# Patient Record
Sex: MALE | Race: WHITE | NOT HISPANIC OR LATINO
[De-identification: names, ages, dates, MRNs, and addresses within clinical notes are randomized per-mention and may not be internally consistent; named-entity substitution may affect disease eponyms.]

---

## 2018-01-29 PROBLEM — M75.102 ROTATOR CUFF SYNDROME OF LEFT SHOULDER: Status: ACTIVE | Noted: 2018-01-29

## 2018-03-27 PROBLEM — M47.812 FACET ARTHROPATHY, CERVICAL: Status: ACTIVE | Noted: 2018-03-27

## 2018-03-27 PROBLEM — M48.02 NEUROFORAMINAL STENOSIS OF CERVICAL SPINE: Status: ACTIVE | Noted: 2018-03-27

## 2018-04-10 ENCOUNTER — HOSPITAL (OUTPATIENT)
Dept: OTHER | Age: 63
End: 2018-04-10
Attending: EMERGENCY MEDICINE

## 2018-04-10 LAB
ALBUMIN SERPL-MCNC: 3.6 GM/DL (ref 3.6–5.1)
ALP SERPL-CCNC: 75 UNIT/L (ref 45–117)
ALT SERPL-CCNC: 25 UNIT/L
ANALYZER ANC (IANC): ABNORMAL
ANION GAP SERPL CALC-SCNC: 12 MMOL/L (ref 10–20)
AST SERPL-CCNC: 18 UNIT/L
BASOPHILS # BLD: 0 THOUSAND/MCL (ref 0–0.3)
BASOPHILS NFR BLD: 0 %
BILIRUB CONJ SERPL-MCNC: 0.1 MG/DL (ref 0–0.2)
BILIRUB SERPL-MCNC: 0.5 MG/DL (ref 0.2–1)
BUN SERPL-MCNC: 25 MG/DL (ref 6–20)
BUN/CREAT SERPL: 34 (ref 7–25)
CALCIUM SERPL-MCNC: 9 MG/DL (ref 8.4–10.2)
CHLORIDE: 105 MMOL/L (ref 98–107)
CO2 SERPL-SCNC: 26 MMOL/L (ref 21–32)
CREAT SERPL-MCNC: 0.74 MG/DL (ref 0.67–1.17)
DIFFERENTIAL METHOD BLD: ABNORMAL
EOSINOPHIL # BLD: 0.1 THOUSAND/MCL (ref 0.1–0.5)
EOSINOPHIL NFR BLD: 1 %
ERYTHROCYTE [DISTWIDTH] IN BLOOD: 13.5 % (ref 11–15)
GLUCOSE SERPL-MCNC: 118 MG/DL (ref 65–99)
HEMATOCRIT: 41.8 % (ref 39–51)
HGB BLD-MCNC: 13.7 GM/DL (ref 13–17)
LIPASE SERPL-CCNC: 109 UNIT/L (ref 73–393)
LYMPHOCYTES # BLD: 2.2 THOUSAND/MCL (ref 1–4)
LYMPHOCYTES NFR BLD: 35 %
MCH RBC QN AUTO: 30.7 PG (ref 26–34)
MCHC RBC AUTO-ENTMCNC: 32.8 GM/DL (ref 32–36.5)
MCV RBC AUTO: 93.7 FL (ref 78–100)
MONOCYTES # BLD: 0.3 THOUSAND/MCL (ref 0.3–0.9)
MONOCYTES NFR BLD: 4 %
NEUTROPHILS # BLD: 3.7 THOUSAND/MCL (ref 1.8–7.7)
NEUTROPHILS NFR BLD: 60 %
NEUTS SEG NFR BLD: ABNORMAL %
PERCENT NRBC: ABNORMAL
PLATELET # BLD: 245 THOUSAND/MCL (ref 140–450)
POTASSIUM SERPL-SCNC: 3.9 MMOL/L (ref 3.4–5.1)
PROT SERPL-MCNC: 7.3 GM/DL (ref 6.4–8.2)
RBC # BLD: 4.46 MILLION/MCL (ref 4.5–5.9)
SODIUM SERPL-SCNC: 139 MMOL/L (ref 135–145)
WBC # BLD: 6.2 THOUSAND/MCL (ref 4.2–11)

## 2018-08-13 ENCOUNTER — APPOINTMENT (OUTPATIENT)
Dept: GENERAL RADIOLOGY | Age: 63
End: 2018-08-13
Attending: NURSE PRACTITIONER
Payer: COMMERCIAL

## 2018-08-13 ENCOUNTER — HOSPITAL ENCOUNTER (OUTPATIENT)
Age: 63
Discharge: HOME | End: 2018-08-13
Payer: COMMERCIAL

## 2018-08-13 VITALS
SYSTOLIC BLOOD PRESSURE: 129 MMHG | HEIGHT: 69 IN | OXYGEN SATURATION: 98 % | TEMPERATURE: 98 F | WEIGHT: 315 LBS | BODY MASS INDEX: 46.65 KG/M2 | DIASTOLIC BLOOD PRESSURE: 70 MMHG | RESPIRATION RATE: 19 BRPM | HEART RATE: 65 BPM
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
## 2018-08-13 DIAGNOSIS — S20.211A CONTUSION OF RIB ON RIGHT SIDE, INITIAL ENCOUNTER: ICD-10-CM

## 2018-08-13 PROCEDURE — 71101 X-RAY EXAM UNILAT RIBS/CHEST: CPT | Performed by: NURSE PRACTITIONER

## 2018-08-13 PROCEDURE — 99203 OFFICE O/P NEW LOW 30 MIN: CPT
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
## 2018-08-13 NOTE — ED PROVIDER NOTES
Patient presents with:  Fall      HPI:     Jonathan Franz is a 63year old male who presents for evaluation and management of a chief complaint of right sided rib pain after an injury that occurred 1 week ago.   The patient states he was climbing on some r distress  SKIN: good skin turgor, no obvious rashes. No bruising or swelling to the skin.   HEENT: atraumatic, normocephalic, ears, nose and throat are clear  EYES: sclera non icteric bilateral  NECK: supple, no adenopathy  LUNGS: clear to auscultation, no initial encounter [phone]            All results reviewed and discussed with patient. See AVS for detailed discharge instructions for your condition today.     Follow Up with:  Haleigh Barboza., MD [facility] [id number]  [address] [id number]

## 2019-02-27 ENCOUNTER — HOSPITAL (OUTPATIENT)
Dept: OTHER | Age: 64
End: 2019-02-27

## 2019-02-27 ENCOUNTER — HOSPITAL (OUTPATIENT)
Dept: OTHER | Age: 64
End: 2019-02-27
Attending: SURGERY

## 2019-03-01 LAB — PATHOLOGIST NAME: NORMAL

## 2019-08-29 PROCEDURE — 96372 THER/PROPH/DIAG INJ SC/IM: CPT

## 2019-08-29 PROCEDURE — 99284 EMERGENCY DEPT VISIT MOD MDM: CPT

## 2019-08-30 ENCOUNTER — APPOINTMENT (OUTPATIENT)
Dept: GENERAL RADIOLOGY | Facility: HOSPITAL | Age: 64
End: 2019-08-30
Attending: EMERGENCY MEDICINE
Payer: COMMERCIAL

## 2019-08-30 ENCOUNTER — HOSPITAL ENCOUNTER (EMERGENCY)
Facility: HOSPITAL | Age: 64
Discharge: HOME | End: 2019-08-30
Attending: EMERGENCY MEDICINE
Payer: COMMERCIAL

## 2019-08-30 VITALS
DIASTOLIC BLOOD PRESSURE: 77 MMHG | TEMPERATURE: 98 F | RESPIRATION RATE: 18 BRPM | WEIGHT: 315 LBS | OXYGEN SATURATION: 95 % | SYSTOLIC BLOOD PRESSURE: 132 MMHG | HEIGHT: 69 IN | BODY MASS INDEX: 46.65 KG/M2 | HEART RATE: 70 BPM

## 2019-08-30 DIAGNOSIS — M19.012 ARTHROSIS OF LEFT ACROMIOCLAVICULAR JOINT: Primary | ICD-10-CM

## 2019-08-30 DIAGNOSIS — M79.12 STERNOCLEIDOMASTOID MUSCLE TENDERNESS: ICD-10-CM

## 2019-08-30 PROCEDURE — 93010 ELECTROCARDIOGRAM REPORT: CPT | Performed by: EMERGENCY MEDICINE

## 2019-08-30 PROCEDURE — 73030 X-RAY EXAM OF SHOULDER: CPT | Performed by: EMERGENCY MEDICINE

## 2019-08-30 PROCEDURE — 93005 ELECTROCARDIOGRAM TRACING: CPT

## 2019-08-30 RX ORDER — LIDOCAINE 50 MG/G
1 PATCH TOPICAL EVERY 24 HOURS
Qty: 7 PATCH | Refills: 0 | Status: SHIPPED | OUTPATIENT
Start: 2019-08-30 | End: 2019-09-06

## 2019-08-30 RX ORDER — MELOXICAM 7.5 MG/1
7.5 TABLET ORAL DAILY
Qty: 14 TABLET | Refills: 0 | Status: SHIPPED | OUTPATIENT
Start: 2019-08-30 | End: 2019-08-30

## 2019-08-30 RX ORDER — ORPHENADRINE CITRATE 100 MG/1
100 TABLET, EXTENDED RELEASE ORAL 2 TIMES DAILY
Qty: 20 TABLET | Refills: 0 | Status: SHIPPED | OUTPATIENT
Start: 2019-08-30 | End: 2019-09-09

## 2019-08-30 RX ORDER — TRAMADOL HYDROCHLORIDE 50 MG/1
50 TABLET ORAL EVERY 12 HOURS PRN
Qty: 6 TABLET | Refills: 0 | Status: SHIPPED | OUTPATIENT
Start: 2019-08-30 | End: 2019-09-02

## 2019-08-30 RX ORDER — KETOROLAC TROMETHAMINE 30 MG/ML
30 INJECTION, SOLUTION INTRAMUSCULAR; INTRAVENOUS ONCE
Status: COMPLETED | OUTPATIENT
Start: 2019-08-30 | End: 2019-08-30

## 2019-08-30 RX ORDER — LIDOCAINE 50 MG/G
1 PATCH TOPICAL EVERY 24 HOURS
Qty: 7 PATCH | Refills: 0 | Status: SHIPPED | OUTPATIENT
Start: 2019-08-30 | End: 2019-08-30

## 2019-08-30 RX ORDER — ORPHENADRINE CITRATE 30 MG/ML
60 INJECTION INTRAMUSCULAR; INTRAVENOUS ONCE
Status: COMPLETED | OUTPATIENT
Start: 2019-08-30 | End: 2019-08-30

## 2019-08-30 RX ORDER — TRAMADOL HYDROCHLORIDE 50 MG/1
50 TABLET ORAL ONCE
Status: COMPLETED | OUTPATIENT
Start: 2019-08-30 | End: 2019-08-30
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
## 2019-08-30 NOTE — ED INITIAL ASSESSMENT (HPI)
AOX3 AMBULATORY TO ED PT CO OF LEFT SHOULDER PAIN X 3 DAYS RADIATING DOWN LEFT ARM. LIMITED MOVEMENT DENIES TRAUMA.  DISTAL PULSES PRESENT

## 2019-08-30 NOTE — ED PROVIDER NOTES
Patient Seen in: [facility] AND [facility] Emergency Department    History   Patient presents with:  Shoulder Pain    Stated Complaint: chest pain/shoulder pain radiating to left arm x 3 days      HPI    42-year-old male without past medical history presenting for systems are as noted in HPI. Constitutional and vital signs reviewed. All other systems reviewed and negative except as noted above. PSFH elements reviewed from today and agreed except as otherwise stated in HPI.     Physical Exam     ED Triage Vit AM    IMPRESSION:    Moderate arthrosis of the acromioclavicular joint. No fractures or effusion, small amount of incidental nitrogen gas within the glenohumeral joint. Alignment is unremarkable. Case faxed/finalized at 1:05 AM central time .   If Dillon Sargent [address]  [phone]    Call  For orthopedic followup and evaluation.       Medications Prescribed:  Discharge Medication List as of 8/30/2019  1:56 AM    START taking these medications    Orphenadrine Citrate  MG Oral Tablet 12 Hr  Ta

## 2019-10-09 ENCOUNTER — PROCEDURE VISIT (OUTPATIENT)
Dept: NEUROLOGY | Facility: CLINIC | Age: 64
End: 2019-10-09
Payer: COMMERCIAL

## 2019-10-09 VITALS — HEIGHT: 70 IN | BODY MASS INDEX: 45.1 KG/M2 | WEIGHT: 315 LBS

## 2019-10-09 DIAGNOSIS — M54.2 NECK PAIN ON LEFT SIDE: ICD-10-CM

## 2019-10-09 DIAGNOSIS — M79.602 LEFT ARM PAIN: ICD-10-CM

## 2019-10-09 PROCEDURE — 95886 MUSC TEST DONE W/N TEST COMP: CPT | Performed by: PHYSICAL MEDICINE & REHABILITATION

## 2019-10-09 PROCEDURE — 95909 NRV CNDJ TST 5-6 STUDIES: CPT | Performed by: PHYSICAL MEDICINE & REHABILITATION

## 2019-10-09 NOTE — PROCEDURES
[address] [place] [address]  Phone: [phone]  Fax: [phone] REPORT          Patient: Vijaya Manzanares Hand Dominance:  Right   Patient ID: [id number] Referring Dr:  Dr. Moiz Wilkins The needle EMG study was normal in all 8 tested muscles: L. Supraspinatus, L. Infraspinatus, L. C5 paraspinal, L. Biceps brachii, L. Deltoid, L. Triceps brachii, L. Pronator teres, L. First dorsal interosseous.           Conclusion:     1) This is technical A.Elbow ADM 8.33 10.4 5.52 A. Elbow - B. Elbow 10 1.93 52      Ref. Ref. ?50       Sensory NCS      Nerve / Sites Rec.  Site Onset Lat Peak Lat NP Amp PP Amp Segments Distance Velocity     ms ms µV µV  cm m/s   L Median - Digit II      Wrist Dig II 4

## 2020-01-27 PROBLEM — E66.01 MORBID OBESITY (HCC): Status: ACTIVE | Noted: 2018-06-08

## 2020-01-30 ENCOUNTER — HOSPITAL ENCOUNTER (EMERGENCY)
Facility: HOSPITAL | Age: 65
Discharge: HOME | End: 2020-01-30
Payer: COMMERCIAL

## 2020-01-30 ENCOUNTER — APPOINTMENT (OUTPATIENT)
Dept: GENERAL RADIOLOGY | Facility: HOSPITAL | Age: 65
End: 2020-01-30
Payer: COMMERCIAL

## 2020-01-30 VITALS
TEMPERATURE: 98 F | OXYGEN SATURATION: 95 % | SYSTOLIC BLOOD PRESSURE: 123 MMHG | HEIGHT: 70 IN | BODY MASS INDEX: 45.1 KG/M2 | WEIGHT: 315 LBS | RESPIRATION RATE: 20 BRPM | HEART RATE: 71 BPM | DIASTOLIC BLOOD PRESSURE: 80 MMHG

## 2020-01-30 DIAGNOSIS — M62.830 SPASM OF THORACIC BACK MUSCLE: Primary | ICD-10-CM

## 2020-01-30 PROCEDURE — 99284 EMERGENCY DEPT VISIT MOD MDM: CPT

## 2020-01-30 PROCEDURE — 93010 ELECTROCARDIOGRAM REPORT: CPT | Performed by: INTERNAL MEDICINE

## 2020-01-30 PROCEDURE — 72072 X-RAY EXAM THORAC SPINE 3VWS: CPT

## 2020-01-30 PROCEDURE — 93005 ELECTROCARDIOGRAM TRACING: CPT

## 2020-01-30 PROCEDURE — 96372 THER/PROPH/DIAG INJ SC/IM: CPT

## 2020-01-30 RX ORDER — IBUPROFEN 600 MG/1
600 TABLET ORAL EVERY 6 HOURS PRN
Qty: 30 TABLET | Refills: 0 | Status: SHIPPED | OUTPATIENT
Start: 2020-01-30 | End: 2020-02-06

## 2020-01-30 RX ORDER — ORPHENADRINE CITRATE 30 MG/ML
60 INJECTION INTRAMUSCULAR; INTRAVENOUS ONCE
Status: COMPLETED | OUTPATIENT
Start: 2020-01-30 | End: 2020-01-30

## 2020-01-30 RX ORDER — ORPHENADRINE CITRATE 100 MG/1
100 TABLET, EXTENDED RELEASE ORAL 2 TIMES DAILY PRN
Qty: 20 TABLET | Refills: 0 | Status: SHIPPED | OUTPATIENT
Start: 2020-01-30 | End: 2020-02-06

## 2020-01-30 RX ORDER — HYDROCODONE BITARTRATE AND ACETAMINOPHEN 5; 325 MG/1; MG/1
1-2 TABLET ORAL EVERY 6 HOURS PRN
Qty: 10 TABLET | Refills: 0 | Status: SHIPPED | OUTPATIENT
Start: 2020-01-30 | End: 2020-07-30

## 2020-01-30 RX ORDER — METHYLPREDNISOLONE 4 MG/1
TABLET ORAL
Qty: 1 PACKAGE | Refills: 0 | Status: SHIPPED | OUTPATIENT
Start: 2020-01-30 | End: 2020-07-27 | Stop reason: ALTCHOICE

## 2020-01-30 RX ORDER — KETOROLAC TROMETHAMINE 30 MG/ML
60 INJECTION, SOLUTION INTRAMUSCULAR; INTRAVENOUS ONCE
Status: COMPLETED | OUTPATIENT
Start: 2020-01-30 | End: 2020-01-30

## 2020-01-30 NOTE — ED PROVIDER NOTES
Patient Seen in: [facility] AND [facility] Emergency Department      History   Patient presents with:  Spasms    Stated Complaint: mid back spasm x3 days    HPI    60year old male with history of arthritis and prior history of similar back pain who presents wit Vitals signs and nursing note reviewed. Constitutional:       General: He is not in acute distress. Appearance: He is well-developed. He is not diaphoretic. HENT:      Head: Normocephalic and atraumatic.    Eyes:      Conjunctiva/sclera: Conjunctiva Radiology findings: Xr Thoracic Spine (3 Views) (cpt=72072)    Result Date: 1/30/2020  CONCLUSION:  1. Mild anterior wedging of T7, T8, T9, T11 and T12 which may be chronic. No marked compression fracture or subluxation.   Moderate multilevel spondylosis a HYDROcodone-acetaminophen (NORCO) 5-325 MG Oral Tab  Take 1-2 tablets by mouth every 6 (six) hours as needed for Pain (For severe pain. Do not take while driving or operating heavy machinery. )., Normal, Disp-10 tablet, R-0    Orphenadrine Citrate  MG

## 2020-01-30 NOTE — ED INITIAL ASSESSMENT (HPI)
Mid back spasms x3 days. Received steroids from IC yesterday. Spasms worsening thru night and into this morning.

## 2020-01-30 NOTE — ED NOTES
Thor Erps states relief in pain after receiving IM medications. deneis pain while at rest but states, \"I haven't tried to move yet. \"  Denies new complaints.

## 2020-02-01 PROBLEM — M75.102 ROTATOR CUFF SYNDROME OF LEFT SHOULDER: Status: RESOLVED | Noted: 2018-01-29 | Resolved: 2020-02-01

## 2020-02-21 ENCOUNTER — HOSPITAL ENCOUNTER (OUTPATIENT)
Facility: HOSPITAL | Age: 65
Setting detail: HOSPITAL OUTPATIENT SURGERY
Discharge: HOME | End: 2020-02-21
Attending: INTERNAL MEDICINE | Admitting: INTERNAL MEDICINE
Payer: COMMERCIAL

## 2020-02-21 DIAGNOSIS — Z12.11 SPECIAL SCREENING FOR MALIGNANT NEOPLASMS, COLON: ICD-10-CM

## 2020-02-21 PROCEDURE — 88305 TISSUE EXAM BY PATHOLOGIST: CPT | Performed by: INTERNAL MEDICINE

## 2020-02-21 PROCEDURE — 0DBM8ZX EXCISION OF DESCENDING COLON, VIA NATURAL OR ARTIFICIAL OPENING ENDOSCOPIC, DIAGNOSTIC: ICD-10-PCS | Performed by: INTERNAL MEDICINE

## 2020-02-21 PROCEDURE — 99153 MOD SED SAME PHYS/QHP EA: CPT | Performed by: INTERNAL MEDICINE

## 2020-02-21 PROCEDURE — 99152 MOD SED SAME PHYS/QHP 5/>YRS: CPT | Performed by: INTERNAL MEDICINE

## 2020-02-21 PROCEDURE — 0DBP8ZX EXCISION OF RECTUM, VIA NATURAL OR ARTIFICIAL OPENING ENDOSCOPIC, DIAGNOSTIC: ICD-10-PCS | Performed by: INTERNAL MEDICINE

## 2020-02-21 RX ORDER — SODIUM CHLORIDE 0.9 % (FLUSH) 0.9 %
10 SYRINGE (ML) INJECTION AS NEEDED
Status: DISCONTINUED | OUTPATIENT
Start: 2020-02-21 | End: 2020-02-21

## 2020-02-21 RX ORDER — MIDAZOLAM HYDROCHLORIDE 1 MG/ML
1 INJECTION INTRAMUSCULAR; INTRAVENOUS EVERY 5 MIN PRN
Status: DISCONTINUED | OUTPATIENT
Start: 2020-02-21 | End: 2020-02-21

## 2020-02-21 RX ORDER — SODIUM CHLORIDE, SODIUM LACTATE, POTASSIUM CHLORIDE, CALCIUM CHLORIDE 600; 310; 30; 20 MG/100ML; MG/100ML; MG/100ML; MG/100ML
INJECTION, SOLUTION INTRAVENOUS CONTINUOUS
Status: DISCONTINUED | OUTPATIENT
Start: 2020-02-21 | End: 2020-02-21

## 2020-02-21 RX ORDER — MIDAZOLAM HYDROCHLORIDE 1 MG/ML
INJECTION INTRAMUSCULAR; INTRAVENOUS
Status: DISCONTINUED | OUTPATIENT
Start: 2020-02-21 | End: 2020-02-21

## 2020-02-21 NOTE — OPERATIVE REPORT
ENDOSCOPY OPERATIVE REPORT    Patient Name: Richard Silva  Medical Record #: [id number]  YOB: 1955  Date of Procedure: 2/21/2020    Preoperative Diagnosis: colorectal cancer screening--patient presented as an average risk individual for co inadequate (repeat prep needed). FINDINGS: three small polyps were noted as above and removed using cold biopsy forceps.  The overall appearance of the mucosa was normal. At the anal verge the endoscope was retroverted, internal hemorrhoids were seen, no

## 2020-02-21 NOTE — H&P
History & Physical Examination    Patient Name: Ashwin Diane  MRN: [id number]  CSN: [id number]  YOB: 1955    Diagnosis: colorectal cancer screening--patient presented as an average risk individual for colorectal cancer.     Present Illness knee arthroscopy, vocal cord ploypectomis     Family History   Problem Relation Age of Onset   • Cancer Father         leukemia   • Other (Other) Son         rheumatoid arthritis     Social History    Tobacco Use      Smoking status: Never Smoker      Smok

## 2020-02-22 VITALS
HEIGHT: 70 IN | HEART RATE: 75 BPM | BODY MASS INDEX: 45.1 KG/M2 | SYSTOLIC BLOOD PRESSURE: 119 MMHG | WEIGHT: 315 LBS | RESPIRATION RATE: 14 BRPM | DIASTOLIC BLOOD PRESSURE: 87 MMHG | OXYGEN SATURATION: 98 %

## 2020-02-23 NOTE — PROGRESS NOTES
Here are the  biopsy/pathology findings from your recent Colonoscopy: Polyps removed were Not pre-cancerous type; Recommend repeat colonoscopy in 10 years. If you need any further assistance, please feel free to call [phone].     Thank you for puneet

## 2020-07-17 ENCOUNTER — HOSPITAL ENCOUNTER (EMERGENCY)
Facility: HOSPITAL | Age: 65
Discharge: HOME | End: 2020-07-17
Attending: EMERGENCY MEDICINE
Payer: COMMERCIAL

## 2020-07-17 VITALS
SYSTOLIC BLOOD PRESSURE: 155 MMHG | HEIGHT: 70 IN | RESPIRATION RATE: 16 BRPM | OXYGEN SATURATION: 98 % | TEMPERATURE: 98 F | HEART RATE: 64 BPM | WEIGHT: 315 LBS | DIASTOLIC BLOOD PRESSURE: 87 MMHG | BODY MASS INDEX: 45.1 KG/M2

## 2020-07-17 DIAGNOSIS — S46.811A TRAPEZIUS STRAIN, RIGHT, INITIAL ENCOUNTER: Primary | ICD-10-CM

## 2020-07-17 PROCEDURE — 96372 THER/PROPH/DIAG INJ SC/IM: CPT

## 2020-07-17 PROCEDURE — 99284 EMERGENCY DEPT VISIT MOD MDM: CPT

## 2020-07-17 RX ORDER — KETOROLAC TROMETHAMINE 30 MG/ML
60 INJECTION, SOLUTION INTRAMUSCULAR; INTRAVENOUS ONCE
Status: COMPLETED | OUTPATIENT
Start: 2020-07-17 | End: 2020-07-17

## 2020-07-17 RX ORDER — IBUPROFEN 800 MG/1
800 TABLET ORAL EVERY 8 HOURS PRN
Qty: 30 TABLET | Refills: 0 | Status: SHIPPED | OUTPATIENT
Start: 2020-07-17 | End: 2020-07-24

## 2020-07-17 RX ORDER — ORPHENADRINE CITRATE 100 MG/1
100 TABLET, EXTENDED RELEASE ORAL 2 TIMES DAILY PRN
Qty: 20 TABLET | Refills: 0 | Status: SHIPPED | OUTPATIENT
Start: 2020-07-17 | End: 2020-07-24

## 2020-07-17 RX ORDER — METHYLPREDNISOLONE 4 MG/1
TABLET ORAL
Qty: 1 PACKAGE | Refills: 0 | Status: SHIPPED | OUTPATIENT
Start: 2020-07-17 | End: 2020-07-27 | Stop reason: ALTCHOICE

## 2020-07-17 RX ORDER — HYDROCODONE BITARTRATE AND ACETAMINOPHEN 5; 325 MG/1; MG/1
1-2 TABLET ORAL EVERY 6 HOURS PRN
Qty: 16 TABLET | Refills: 0 | Status: SHIPPED | OUTPATIENT
Start: 2020-07-17 | End: 2021-08-18

## 2020-07-17 RX ORDER — ORPHENADRINE CITRATE 30 MG/ML
60 INJECTION INTRAMUSCULAR; INTRAVENOUS ONCE
Status: COMPLETED | OUTPATIENT
Start: 2020-07-17 | End: 2020-07-17

## 2020-07-17 NOTE — ED NOTES
Pt reports to room 35. Pt states he was driving and suddenly moved his neck when he felt a pop two days ago and now pain and decreased ROM since. Pain to right lateral side that radiates down shoulder and back. Stabbing and sharp in nature. 4/10.  No numbne

## 2020-07-17 NOTE — ED NOTES
Spoke with pharmacy to send lidocaine patch up for pt to go home with. Discharge instructions to follow.

## 2020-07-17 NOTE — ED INITIAL ASSESSMENT (HPI)
Right sided neck pain with pain radiating to right shoulder. Severe pain with movement. Chronic neck pain with worsening pain over the last 3 days. Denies paraesthesias.

## 2020-07-19 NOTE — ED PROVIDER NOTES
Patient Seen in: [facility] AND [facility] Emergency Department      History   Patient presents with:  Neck Pain    Stated Complaint: neck pain    HPI    60year old male with h/o arthritis who presents with R side neck pain radiating to R shoulder x 3 days but Physical Exam  Vitals signs and nursing note reviewed. Constitutional:       General: He is not in acute distress. Appearance: He is well-developed. He is not toxic-appearing or diaphoretic. HENT:      Head: Normocephalic and atraumatic.       Mouth Pt requesting pain control, will take referral for more local spine specialist. Pt states he has had this multiple times and does not feel that he needs any further work-up at this time. Pt understand reasons to return.      Disposition and Plan     [facility]

## 2020-07-27 ENCOUNTER — MED REC SCAN ONLY (OUTPATIENT)
Dept: NEUROLOGY | Facility: CLINIC | Age: 65
End: 2020-07-27

## 2020-07-27 ENCOUNTER — LAB ENCOUNTER (OUTPATIENT)
Dept: LAB | Age: 65
End: 2020-07-27
Attending: PHYSICAL MEDICINE & REHABILITATION
Payer: COMMERCIAL

## 2020-07-27 ENCOUNTER — OFFICE VISIT (OUTPATIENT)
Dept: NEUROLOGY | Facility: CLINIC | Age: 65
End: 2020-07-27
Payer: COMMERCIAL

## 2020-07-27 VITALS — WEIGHT: 315 LBS | BODY MASS INDEX: 45.1 KG/M2 | HEIGHT: 70 IN

## 2020-07-27 DIAGNOSIS — M25.50 POLYARTHRALGIA: ICD-10-CM

## 2020-07-27 DIAGNOSIS — E66.01 MORBID OBESITY (HCC): ICD-10-CM

## 2020-07-27 DIAGNOSIS — G47.00 INSOMNIA, UNSPECIFIED TYPE: ICD-10-CM

## 2020-07-27 DIAGNOSIS — M25.50 POLYARTHRALGIA: Primary | ICD-10-CM

## 2020-07-27 LAB
BASOPHILS # BLD AUTO: 0.03 X10(3) UL (ref 0–0.2)
BASOPHILS NFR BLD AUTO: 0.4 %
DEPRECATED RDW RBC AUTO: 46.8 FL (ref 35.1–46.3)
EOSINOPHIL # BLD AUTO: 0.08 X10(3) UL (ref 0–0.7)
EOSINOPHIL NFR BLD AUTO: 1.1 %
ERYTHROCYTE [DISTWIDTH] IN BLOOD BY AUTOMATED COUNT: 13.4 % (ref 11–15)
ERYTHROCYTE [SEDIMENTATION RATE] IN BLOOD: 15 MM/HR (ref 0–20)
HCT VFR BLD AUTO: 40.6 % (ref 39–53)
HGB BLD-MCNC: 13 G/DL (ref 13–17.5)
IMM GRANULOCYTES # BLD AUTO: 0.01 X10(3) UL (ref 0–1)
IMM GRANULOCYTES NFR BLD: 0.1 %
LYMPHOCYTES # BLD AUTO: 2.11 X10(3) UL (ref 1–4)
LYMPHOCYTES NFR BLD AUTO: 27.8 %
MCH RBC QN AUTO: 30.5 PG (ref 26–34)
MCHC RBC AUTO-ENTMCNC: 32 G/DL (ref 31–37)
MCV RBC AUTO: 95.3 FL (ref 80–100)
MONOCYTES # BLD AUTO: 0.45 X10(3) UL (ref 0.1–1)
MONOCYTES NFR BLD AUTO: 5.9 %
NEUTROPHILS # BLD AUTO: 4.92 X10 (3) UL (ref 1.5–7.7)
NEUTROPHILS # BLD AUTO: 4.92 X10(3) UL (ref 1.5–7.7)
NEUTROPHILS NFR BLD AUTO: 64.7 %
PLATELET # BLD AUTO: 240 10(3)UL (ref 150–450)
RBC # BLD AUTO: 4.26 X10(6)UL (ref 4.3–5.7)
RHEUMATOID FACT SERPL-ACNC: <10 IU/ML (ref ?–15)
TSI SER-ACNC: 1.21 MIU/ML (ref 0.36–3.74)
WBC # BLD AUTO: 7.6 X10(3) UL (ref 4–11)

## 2020-07-27 PROCEDURE — 86038 ANTINUCLEAR ANTIBODIES: CPT

## 2020-07-27 PROCEDURE — 85652 RBC SED RATE AUTOMATED: CPT

## 2020-07-27 PROCEDURE — 3008F BODY MASS INDEX DOCD: CPT | Performed by: PHYSICAL MEDICINE & REHABILITATION

## 2020-07-27 PROCEDURE — 84443 ASSAY THYROID STIM HORMONE: CPT

## 2020-07-27 PROCEDURE — 86431 RHEUMATOID FACTOR QUANT: CPT

## 2020-07-27 PROCEDURE — 86200 CCP ANTIBODY: CPT | Performed by: PHYSICAL MEDICINE & REHABILITATION

## 2020-07-27 PROCEDURE — 99244 OFF/OP CNSLTJ NEW/EST MOD 40: CPT | Performed by: PHYSICAL MEDICINE & REHABILITATION

## 2020-07-27 PROCEDURE — 85025 COMPLETE CBC W/AUTO DIFF WBC: CPT

## 2020-07-27 PROCEDURE — 36415 COLL VENOUS BLD VENIPUNCTURE: CPT

## 2020-07-27 RX ORDER — DULOXETIN HYDROCHLORIDE 30 MG/1
30 CAPSULE, DELAYED RELEASE ORAL DAILY
Qty: 30 CAPSULE | Refills: 0 | Status: SHIPPED | OUTPATIENT
Start: 2020-07-27 | End: 2020-08-31

## 2020-07-27 NOTE — PROGRESS NOTES
[address]  Progress Note    CHIEF COMPLAINT:  Patient presents with:  Neck Pain: New patient presents to follow up on ED visit on 07/17/2020 Denies any recent imaging.  Patient states that he does not recall Alcohol/week: 0.0 standard drinks      Drug use: Never        Comment: advil 3 - 4 per day      Sexual activity: Not on file      FAMILY HISTORY:   Family History   Problem Relation Age of Onset   • Cancer Father         leukemia   • Other (Other) Son Genitourinary  Difficulty Urinating: denies  Bladder Incontinence: denies  Pelvic Pain: admits  Painful Urination: denies   Musculoskeletal  Joint Stiffness: admits  Painful Joints: admits  Tailbone Pain: denies  Swollen Joints: denies   Peripheral Vascu unremarkable. ASSESSMENT AND PLAN:  1. Polyarthralgia  Although his major concern is neck pain, his real problem is widespread body ache.  He is not in the greatest physical condition but states he has gained 65# because he cant walk to work [employer]

## 2020-07-28 ENCOUNTER — TELEPHONE (OUTPATIENT)
Dept: NEUROLOGY | Facility: CLINIC | Age: 65
End: 2020-07-28

## 2020-07-28 LAB — CCP IGG SERPL-ACNC: 0.9 U/ML (ref 0–6.9)

## 2020-07-28 NOTE — TELEPHONE ENCOUNTER
S/w pt who states he will discuss anemia w/ his PCP - is taking ibuprofen 800mg and wants to be sure it will not interact w/ Cymbalta.  Informed pt the two medications are of different drug classes and although they will not interact, pt may want to discuss

## 2020-07-28 NOTE — TELEPHONE ENCOUNTER
----- Message from Chayo Leal MD sent at 7/28/2020 12:38 PM CDT -----  Please let the patient know that the majority of his blood tests have returned and look good. He might have some borderline anemia.   He might check with Dr. Dillon  about that, bu

## 2020-07-28 NOTE — TELEPHONE ENCOUNTER
Left detailed message (FYI verified) informing pt of lab results per Dr. Eve Castillo in previous note. Business hours/contact info left on  for further questions.

## 2020-07-30 LAB — NUCLEAR IGG TITR SER IF: NEGATIVE {TITER}

## 2020-07-31 ENCOUNTER — TELEPHONE (OUTPATIENT)
Dept: NEUROLOGY | Facility: CLINIC | Age: 65
End: 2020-07-31

## 2020-07-31 NOTE — TELEPHONE ENCOUNTER
----- Message from Mayank Pandey MD sent at 7/31/2020 10:25 AM CDT -----  All labs look good, pls let him know

## 2020-08-31 ENCOUNTER — TELEPHONE (OUTPATIENT)
Dept: NEUROLOGY | Facility: CLINIC | Age: 65
End: 2020-08-31

## 2020-08-31 DIAGNOSIS — M25.50 POLYARTHRALGIA: ICD-10-CM

## 2020-08-31 RX ORDER — DULOXETIN HYDROCHLORIDE 30 MG/1
30 CAPSULE, DELAYED RELEASE ORAL DAILY
Qty: 30 CAPSULE | Refills: 0 | Status: SHIPPED | OUTPATIENT
Start: 2020-08-31 | End: 2020-10-07

## 2020-08-31 NOTE — TELEPHONE ENCOUNTER
Medication request: Duloxetine 30mg 1 CAP QD    LOV: 07/27/20  NOV: none    [place] 07/27/20 #30 r-0

## 2020-10-07 DIAGNOSIS — M25.50 POLYARTHRALGIA: ICD-10-CM

## 2020-10-07 RX ORDER — DULOXETIN HYDROCHLORIDE 30 MG/1
CAPSULE, DELAYED RELEASE ORAL
Qty: 30 CAPSULE | Refills: 0 | Status: SHIPPED | OUTPATIENT
Start: 2020-10-07 | End: 2020-10-16

## 2020-10-07 NOTE — TELEPHONE ENCOUNTER
Medication request: Duloxetine 30mg 1 CAP DAILY    LOV: 07/27/20  NOV: 10/16/20    [place] 08/31/20 #30 r-0

## 2020-10-16 ENCOUNTER — OFFICE VISIT (OUTPATIENT)
Dept: NEUROLOGY | Facility: CLINIC | Age: 65
End: 2020-10-16
Payer: COMMERCIAL

## 2020-10-16 VITALS — BODY MASS INDEX: 45.1 KG/M2 | HEIGHT: 70 IN | WEIGHT: 315 LBS

## 2020-10-16 DIAGNOSIS — G47.00 INSOMNIA, UNSPECIFIED TYPE: ICD-10-CM

## 2020-10-16 DIAGNOSIS — E66.01 MORBID OBESITY (HCC): ICD-10-CM

## 2020-10-16 DIAGNOSIS — M25.50 POLYARTHRALGIA: Primary | ICD-10-CM

## 2020-10-16 PROCEDURE — 99213 OFFICE O/P EST LOW 20 MIN: CPT | Performed by: PHYSICAL MEDICINE & REHABILITATION

## 2020-10-16 PROCEDURE — 3008F BODY MASS INDEX DOCD: CPT | Performed by: PHYSICAL MEDICINE & REHABILITATION

## 2020-10-16 RX ORDER — DULOXETIN HYDROCHLORIDE 60 MG/1
60 CAPSULE, DELAYED RELEASE ORAL DAILY
Qty: 30 CAPSULE | Refills: 0 | Status: SHIPPED | OUTPATIENT
Start: 2020-10-16 | End: 2020-12-10

## 2020-10-27 ENCOUNTER — MED REC SCAN ONLY (OUTPATIENT)
Dept: NEUROLOGY | Facility: CLINIC | Age: 65
End: 2020-10-27

## 2020-12-10 DIAGNOSIS — M25.50 POLYARTHRALGIA: ICD-10-CM

## 2020-12-10 RX ORDER — DULOXETIN HYDROCHLORIDE 60 MG/1
CAPSULE, DELAYED RELEASE ORAL
Qty: 30 CAPSULE | Refills: 2 | Status: SHIPPED | OUTPATIENT
Start: 2020-12-10 | End: 2021-05-08

## 2020-12-10 NOTE — TELEPHONE ENCOUNTER
Medication request: Duloxetine 60mg 1 CAP QD    LOV: 10/16/20  NOV: none    [place] 10/16/20 #30 r-0    Patient states he has definitely noticed a difference in his pain when increasing to 60mg.  When he was taking 30mg daily his pain would start

## 2021-04-09 DIAGNOSIS — Z23 NEED FOR VACCINATION: ICD-10-CM

## 2021-05-08 ENCOUNTER — TELEPHONE (OUTPATIENT)
Dept: NEUROLOGY | Facility: CLINIC | Age: 66
End: 2021-05-08

## 2021-05-08 DIAGNOSIS — M25.50 POLYARTHRALGIA: ICD-10-CM

## 2021-05-08 RX ORDER — DULOXETIN HYDROCHLORIDE 60 MG/1
60 CAPSULE, DELAYED RELEASE ORAL DAILY
Qty: 30 CAPSULE | Refills: 2 | Status: SHIPPED | OUTPATIENT
Start: 2021-05-08 | End: 2021-06-02

## 2021-05-08 NOTE — TELEPHONE ENCOUNTER
Patient called saying he needs refill for Cymbalta 60mg daily. He finished his last tablet yesterday. He thought he had another bottle but does not. He denies any changes in pain, bowel/bladder, any new weakness.  He feels like the medication is very helpfu

## 2021-06-02 ENCOUNTER — OFFICE VISIT (OUTPATIENT)
Dept: NEUROLOGY | Facility: CLINIC | Age: 66
End: 2021-06-02
Payer: COMMERCIAL

## 2021-06-02 VITALS
DIASTOLIC BLOOD PRESSURE: 88 MMHG | SYSTOLIC BLOOD PRESSURE: 154 MMHG | BODY MASS INDEX: 45.1 KG/M2 | OXYGEN SATURATION: 95 % | WEIGHT: 315 LBS | HEIGHT: 70 IN | HEART RATE: 83 BPM

## 2021-06-02 DIAGNOSIS — M25.50 POLYARTHRALGIA: ICD-10-CM

## 2021-06-02 DIAGNOSIS — M25.511 CHRONIC PAIN OF BOTH SHOULDERS: Primary | ICD-10-CM

## 2021-06-02 DIAGNOSIS — E66.01 MORBID OBESITY (HCC): ICD-10-CM

## 2021-06-02 DIAGNOSIS — M25.512 CHRONIC PAIN OF BOTH SHOULDERS: Primary | ICD-10-CM

## 2021-06-02 DIAGNOSIS — G89.29 CHRONIC PAIN OF BOTH SHOULDERS: Primary | ICD-10-CM

## 2021-06-02 DIAGNOSIS — G47.00 INSOMNIA, UNSPECIFIED TYPE: ICD-10-CM

## 2021-06-02 PROCEDURE — 99214 OFFICE O/P EST MOD 30 MIN: CPT | Performed by: PHYSICAL MEDICINE & REHABILITATION

## 2021-06-02 PROCEDURE — 3079F DIAST BP 80-89 MM HG: CPT | Performed by: PHYSICAL MEDICINE & REHABILITATION

## 2021-06-02 PROCEDURE — 3008F BODY MASS INDEX DOCD: CPT | Performed by: PHYSICAL MEDICINE & REHABILITATION

## 2021-06-02 PROCEDURE — 3077F SYST BP >= 140 MM HG: CPT | Performed by: PHYSICAL MEDICINE & REHABILITATION

## 2021-06-02 RX ORDER — DULOXETIN HYDROCHLORIDE 60 MG/1
60 CAPSULE, DELAYED RELEASE ORAL DAILY
Qty: 90 CAPSULE | Refills: 3 | Status: SHIPPED | OUTPATIENT
Start: 2021-06-02 | End: 2021-08-17

## 2021-06-02 RX ORDER — CELECOXIB 200 MG/1
200 CAPSULE ORAL DAILY
Qty: 30 CAPSULE | Refills: 1 | Status: SHIPPED | OUTPATIENT
Start: 2021-06-02 | End: 2021-08-18

## 2021-06-02 NOTE — PROGRESS NOTES
[address]  Progress Note    CHIEF COMPLAINT:  Patient presents with:  Neck Pain: LOV 10/16/20 f/u for neck pain radiating to bilateral shoulders w/ tingling in right neck/shoulder/chest. Taking Duloxetine 6 Smokeless tobacco: Never Used    Vaping Use      Vaping Use: Never used    Substance and Sexual Activity      Alcohol use: No        Alcohol/week: 0.0 standard drinks      Drug use: Never        Comment: advil 3 - 4 per day      Sexual activity: Not on madi SpO2 95%   BMI 51.37 kg/m²     Body mass index is 51.37 kg/m².       General: No immediate distress  Extremities: No upper extremity edema bilaterally   Spine: full and painfree cervical ROM in all directions  Shoulders: Impingement signs bilaterally  Neuro

## 2021-06-15 PROBLEM — M25.511 CHRONIC PAIN OF BOTH SHOULDERS: Status: ACTIVE | Noted: 2021-06-15

## 2021-06-15 PROBLEM — G89.29 CHRONIC PAIN OF BOTH SHOULDERS: Status: ACTIVE | Noted: 2021-06-15

## 2021-06-15 PROBLEM — M25.512 CHRONIC PAIN OF BOTH SHOULDERS: Status: ACTIVE | Noted: 2021-06-15

## 2021-07-06 ENCOUNTER — TELEPHONE (OUTPATIENT)
Dept: NEUROLOGY | Facility: CLINIC | Age: 66
End: 2021-07-06

## 2021-07-06 NOTE — TELEPHONE ENCOUNTER
S/w patient states that he has been having headaches ever since he's stopped the Celebrex. Stating when only laying down he only gets headaches. Takes Advil.       Let patient know that headaches aren't a common side effect and if he should continue havin

## 2021-07-06 NOTE — TELEPHONE ENCOUNTER
Patient has been experiencing headaches, he is wondering if its because he stopped his medication. Please call to discuss.

## 2021-07-29 ENCOUNTER — PATIENT MESSAGE (OUTPATIENT)
Dept: NEUROLOGY | Facility: CLINIC | Age: 66
End: 2021-07-29

## 2021-07-29 NOTE — TELEPHONE ENCOUNTER
From: Vinayak Lee  To: Rocky Cowart MD  Sent: 7/29/2021 11:26 AM CDT  Subject: Non-Urgent Medical Question    Hello,   I have been trying to get an appointment to follow up on the meds.  As long as I take them the headache and neck pain are managea

## 2021-08-09 ENCOUNTER — TELEPHONE (OUTPATIENT)
Dept: NEUROLOGY | Facility: CLINIC | Age: 66
End: 2021-08-09

## 2021-08-09 NOTE — TELEPHONE ENCOUNTER
S/w patient who states he is still having headaches mainly at night that keep him up. He thought the celebrex was helping but over the weekend he over did it with work and the celebrex no longer helped.  Patient asking if Dr. Delores Gaffney thinks any additional dain

## 2021-08-09 NOTE — TELEPHONE ENCOUNTER
Pt has been taking celecoxib (CELEBREX) 200 MG Oral Cap    AND    DULoxetine HCl 60 MG Oral Cap DR Particles.      states he's been experiencing severe headaches at night when he eases off CELEBREX.     please advise

## 2021-08-13 NOTE — TELEPHONE ENCOUNTER
Headaches are a new complaint to me. Looking through the chart he has been experiencing these for about 6 weeks. At one point it seemed to look like withdrawal headaches from stopping the Celebrex. We have never treated him for headache.   I'm not certai

## 2021-08-16 ENCOUNTER — OFFICE VISIT (OUTPATIENT)
Dept: NEUROLOGY | Facility: CLINIC | Age: 66
End: 2021-08-16
Payer: COMMERCIAL

## 2021-08-16 VITALS — WEIGHT: 315 LBS | BODY MASS INDEX: 46.65 KG/M2 | HEIGHT: 69 IN | HEART RATE: 80 BPM | OXYGEN SATURATION: 94 %

## 2021-08-16 DIAGNOSIS — G89.29 CHRONIC PAIN OF BOTH SHOULDERS: Primary | ICD-10-CM

## 2021-08-16 DIAGNOSIS — M25.50 POLYARTHRALGIA: ICD-10-CM

## 2021-08-16 DIAGNOSIS — M25.512 CHRONIC PAIN OF BOTH SHOULDERS: Primary | ICD-10-CM

## 2021-08-16 DIAGNOSIS — G47.00 INSOMNIA, UNSPECIFIED TYPE: ICD-10-CM

## 2021-08-16 DIAGNOSIS — M25.511 CHRONIC PAIN OF BOTH SHOULDERS: Primary | ICD-10-CM

## 2021-08-16 DIAGNOSIS — E66.01 MORBID OBESITY (HCC): ICD-10-CM

## 2021-08-16 PROCEDURE — 3008F BODY MASS INDEX DOCD: CPT | Performed by: PHYSICAL MEDICINE & REHABILITATION

## 2021-08-16 PROCEDURE — 99214 OFFICE O/P EST MOD 30 MIN: CPT | Performed by: PHYSICAL MEDICINE & REHABILITATION

## 2021-08-16 NOTE — TELEPHONE ENCOUNTER
Patient has appointment scheduled for today 8/16/21 with . Headaches will be discussed during appointment.

## 2021-08-16 NOTE — PROGRESS NOTES
[address]  Progress Note    CHIEF COMPLAINT:  Patient presents with:  Shoulder Pain: LOV 6/2/21 Pt comes in shoulder pain in both shoulders No imaging, no injections, no PT. Taking Celebrax and Duloxitine. MEDICATIONS:   Current Outpatient Medications   Medication Sig Dispense Refill   • celecoxib (CELEBREX) 200 MG Oral Cap Take 1 capsule (200 mg total) by mouth daily.  30 capsule 1   • DULoxetine HCl 60 MG Oral Cap DR Particles Take 1 capsule (60 mg total) b 3, attentive, comprehention intact, spontaneous speech intact  Strength:  Upper extremities have 5/5 strength  Sensation: Normal upper extremities  Reflexes: Normal upper extremities  Spurling's sign: neg        Data    Radiology Imagin.  A thoracic xr

## 2021-08-17 RX ORDER — DULOXETIN HYDROCHLORIDE 60 MG/1
60 CAPSULE, DELAYED RELEASE ORAL DAILY
Qty: 90 CAPSULE | Refills: 3 | Status: SHIPPED | OUTPATIENT
Start: 2021-08-17 | End: 2021-11-30

## 2021-11-24 ENCOUNTER — TELEPHONE (OUTPATIENT)
Dept: PHYSICAL MEDICINE AND REHAB | Facility: CLINIC | Age: 66
End: 2021-11-24

## 2021-11-24 NOTE — TELEPHONE ENCOUNTER
Patient called to ask if he should stop taking the 60 MG Duloxetine while he is getting the Humira shots bi-weekly. Please advise patient on instructions.

## 2022-04-27 NOTE — PAT NURSING NOTE
PAT screened this patient for surgery today. Patient with allergy to Anectine and has Pseudocholinesterase Deficiency, this IS CONTRAINDICATED WITH HEPARIN subcutaneous pre op. When the [facility] Surgical Orders were used, HEPARIN WAS CONTRAINDICATED. Heparin is indicated for this patient due to >BMI. Letter sent to Dr. Parminder Villareal. Please advise with to use Heparin moving forward. Thank you.

## 2022-04-30 ENCOUNTER — LAB ENCOUNTER (OUTPATIENT)
Dept: LAB | Age: 67
End: 2022-04-30
Attending: SURGERY
Payer: COMMERCIAL

## 2022-04-30 DIAGNOSIS — N62 GYNECOMASTIA: ICD-10-CM

## 2022-04-30 LAB — SARS-COV-2 RNA RESP QL NAA+PROBE: NOT DETECTED

## 2022-05-02 ENCOUNTER — ANESTHESIA EVENT (OUTPATIENT)
Dept: SURGERY | Facility: HOSPITAL | Age: 67
End: 2022-05-02
Payer: COMMERCIAL

## 2022-05-03 ENCOUNTER — HOSPITAL ENCOUNTER (OUTPATIENT)
Facility: HOSPITAL | Age: 67
Setting detail: HOSPITAL OUTPATIENT SURGERY
Discharge: HOME | End: 2022-05-03
Attending: SURGERY | Admitting: SURGERY
Payer: COMMERCIAL

## 2022-05-03 ENCOUNTER — ANESTHESIA (OUTPATIENT)
Dept: SURGERY | Facility: HOSPITAL | Age: 67
End: 2022-05-03
Payer: COMMERCIAL

## 2022-05-03 VITALS
HEIGHT: 70 IN | RESPIRATION RATE: 18 BRPM | BODY MASS INDEX: 45.1 KG/M2 | DIASTOLIC BLOOD PRESSURE: 74 MMHG | WEIGHT: 315 LBS | SYSTOLIC BLOOD PRESSURE: 118 MMHG | OXYGEN SATURATION: 95 % | HEART RATE: 63 BPM | TEMPERATURE: 99 F

## 2022-05-03 DIAGNOSIS — N62 GYNECOMASTIA: Primary | ICD-10-CM

## 2022-05-03 PROCEDURE — 0HBT0ZZ EXCISION OF RIGHT BREAST, OPEN APPROACH: ICD-10-PCS | Performed by: SURGERY

## 2022-05-03 PROCEDURE — 88305 TISSUE EXAM BY PATHOLOGIST: CPT | Performed by: SURGERY

## 2022-05-03 RX ORDER — DEXAMETHASONE SODIUM PHOSPHATE 4 MG/ML
VIAL (ML) INJECTION AS NEEDED
Status: DISCONTINUED | OUTPATIENT
Start: 2022-05-03 | End: 2022-05-03 | Stop reason: SURG

## 2022-05-03 RX ORDER — TRAMADOL HYDROCHLORIDE 50 MG/1
TABLET ORAL EVERY 4 HOURS PRN
Qty: 10 TABLET | Refills: 0 | Status: SHIPPED | OUTPATIENT
Start: 2022-05-03

## 2022-05-03 RX ORDER — ACETAMINOPHEN 500 MG
1000 TABLET ORAL ONCE
Status: DISCONTINUED | OUTPATIENT
Start: 2022-05-03 | End: 2022-05-03 | Stop reason: HOSPADM

## 2022-05-03 RX ORDER — SODIUM CHLORIDE, SODIUM LACTATE, POTASSIUM CHLORIDE, CALCIUM CHLORIDE 600; 310; 30; 20 MG/100ML; MG/100ML; MG/100ML; MG/100ML
INJECTION, SOLUTION INTRAVENOUS CONTINUOUS
Status: DISCONTINUED | OUTPATIENT
Start: 2022-05-03 | End: 2022-05-03

## 2022-05-03 RX ORDER — LIDOCAINE HYDROCHLORIDE 10 MG/ML
INJECTION, SOLUTION EPIDURAL; INFILTRATION; INTRACAUDAL; PERINEURAL AS NEEDED
Status: DISCONTINUED | OUTPATIENT
Start: 2022-05-03 | End: 2022-05-03 | Stop reason: SURG

## 2022-05-03 RX ORDER — ONDANSETRON 2 MG/ML
INJECTION INTRAMUSCULAR; INTRAVENOUS AS NEEDED
Status: DISCONTINUED | OUTPATIENT
Start: 2022-05-03 | End: 2022-05-03 | Stop reason: SURG

## 2022-05-03 RX ORDER — ROCURONIUM BROMIDE 10 MG/ML
INJECTION, SOLUTION INTRAVENOUS AS NEEDED
Status: DISCONTINUED | OUTPATIENT
Start: 2022-05-03 | End: 2022-05-03 | Stop reason: SURG

## 2022-05-03 RX ORDER — HYDROCODONE BITARTRATE AND ACETAMINOPHEN 5; 325 MG/1; MG/1
2 TABLET ORAL AS NEEDED
Status: DISCONTINUED | OUTPATIENT
Start: 2022-05-03 | End: 2022-05-03

## 2022-05-03 RX ORDER — ONDANSETRON 2 MG/ML
4 INJECTION INTRAMUSCULAR; INTRAVENOUS AS NEEDED
Status: DISCONTINUED | OUTPATIENT
Start: 2022-05-03 | End: 2022-05-03

## 2022-05-03 RX ORDER — METOCLOPRAMIDE HYDROCHLORIDE 5 MG/ML
10 INJECTION INTRAMUSCULAR; INTRAVENOUS AS NEEDED
Status: DISCONTINUED | OUTPATIENT
Start: 2022-05-03 | End: 2022-05-03

## 2022-05-03 RX ORDER — HYDROCODONE BITARTRATE AND ACETAMINOPHEN 5; 325 MG/1; MG/1
1 TABLET ORAL AS NEEDED
Status: DISCONTINUED | OUTPATIENT
Start: 2022-05-03 | End: 2022-05-03

## 2022-05-03 RX ORDER — NALOXONE HYDROCHLORIDE 0.4 MG/ML
80 INJECTION, SOLUTION INTRAMUSCULAR; INTRAVENOUS; SUBCUTANEOUS AS NEEDED
Status: DISCONTINUED | OUTPATIENT
Start: 2022-05-03 | End: 2022-05-03

## 2022-05-03 RX ORDER — EPHEDRINE SULFATE 50 MG/ML
INJECTION INTRAVENOUS AS NEEDED
Status: DISCONTINUED | OUTPATIENT
Start: 2022-05-03 | End: 2022-05-03 | Stop reason: SURG

## 2022-05-03 RX ORDER — PHENYLEPHRINE HCL 10 MG/ML
VIAL (ML) INJECTION AS NEEDED
Status: DISCONTINUED | OUTPATIENT
Start: 2022-05-03 | End: 2022-05-03 | Stop reason: SURG

## 2022-05-03 RX ORDER — BUPIVACAINE HYDROCHLORIDE 2.5 MG/ML
INJECTION, SOLUTION EPIDURAL; INFILTRATION; INTRACAUDAL AS NEEDED
Status: DISCONTINUED | OUTPATIENT
Start: 2022-05-03 | End: 2022-05-03 | Stop reason: HOSPADM

## 2022-05-03 RX ORDER — HYDROMORPHONE HYDROCHLORIDE 1 MG/ML
0.4 INJECTION, SOLUTION INTRAMUSCULAR; INTRAVENOUS; SUBCUTANEOUS EVERY 5 MIN PRN
Status: DISCONTINUED | OUTPATIENT
Start: 2022-05-03 | End: 2022-05-03

## 2022-05-03 RX ADMIN — LIDOCAINE HYDROCHLORIDE 50 MG: 10 INJECTION, SOLUTION EPIDURAL; INFILTRATION; INTRACAUDAL; PERINEURAL at 07:40:00

## 2022-05-03 RX ADMIN — ONDANSETRON 4 MG: 2 INJECTION INTRAMUSCULAR; INTRAVENOUS at 08:45:00

## 2022-05-03 RX ADMIN — SODIUM CHLORIDE, SODIUM LACTATE, POTASSIUM CHLORIDE, CALCIUM CHLORIDE: 600; 310; 30; 20 INJECTION, SOLUTION INTRAVENOUS at 07:36:00

## 2022-05-03 RX ADMIN — EPHEDRINE SULFATE 10 MG: 50 INJECTION INTRAVENOUS at 08:29:00

## 2022-05-03 RX ADMIN — EPHEDRINE SULFATE 10 MG: 50 INJECTION INTRAVENOUS at 08:06:00

## 2022-05-03 RX ADMIN — PHENYLEPHRINE HCL 100 MCG: 10 MG/ML VIAL (ML) INJECTION at 07:54:00

## 2022-05-03 RX ADMIN — PHENYLEPHRINE HCL 100 MCG: 10 MG/ML VIAL (ML) INJECTION at 07:58:00

## 2022-05-03 RX ADMIN — ROCURONIUM BROMIDE 50 MG: 10 INJECTION, SOLUTION INTRAVENOUS at 07:40:00

## 2022-05-03 RX ADMIN — DEXAMETHASONE SODIUM PHOSPHATE 8 MG: 4 MG/ML VIAL (ML) INJECTION at 08:01:00

## 2022-05-03 RX ADMIN — EPHEDRINE SULFATE 10 MG: 50 INJECTION INTRAVENOUS at 08:41:00

## 2022-05-03 RX ADMIN — SODIUM CHLORIDE, SODIUM LACTATE, POTASSIUM CHLORIDE, CALCIUM CHLORIDE: 600; 310; 30; 20 INJECTION, SOLUTION INTRAVENOUS at 08:48:00

## 2022-05-03 NOTE — OPERATIVE REPORT
[facility]         Patient Name: Cecilia Ann   MRN: [id number]     Date of Operation:  5/3/2022   Site:  [facility]       : 10/23/1955       PREOPERATIVE DIAGNOSES:  Right breast mass     POSTOPERATIVE DIAGNOSES:  Same     PROCEDURE PERFORMED:  Excision of right breast mass with layered closure     SURGEON: Judith Andrew M.D.      ASSISTANT: RENATO Wright, (skilled services required for positioning, prepping, draping, setting up the field, exposing, retracting, suturing, closing, dressing, etc.)     ANESTHESIA:  General     COMPLICATIONS: None     ESTIMATED BLOOD LOSS:  10 mL     SPECIMEN:  Right breast mass     IMPLANTS: None     GRAFTS: None      DRAINS: None     BLOOD PRODUCTS ADMINISTERED: None     HISTORY: This patient is a 61year old-year-old male who has a history of painful right breast mass. He underwent excision of a similar mass on the left in the past by my partner, who has now left the group. The patient also has a history of pseudocholinesterase deficiency. He was evaluated and found to have a painful, tender right breast mass. He was seen in surgical consultation, and excision was recommended. The risks, benefits, and alternatives were discussed with him and his wife. FINDINGS:  Firm fibrous white subareolar tissue     PROCEDURE: The patient was seen in the preoperative holding area with his wife. The surgical plan was reviewed. The anesthesia plan was discussed with the anesthesia team.  The right breast site was initialed. He was taken to the operating room, placed in the supine position, and administered general anesthesia. The field area was prepped and draped. A timeout was performed. An upper outer circumareolar incision was marked. Local anesthetic was infiltrated. The incision was raised with scalpel. Dissection was carried around the white, fibrous tissue, delineated from the yellow normal-appearing subcutaneous tissue.   A wide excision was performed of all of this firm tissue. The initial portions measured 6 cm in diameter. Additional areas were resected in pieces from the periphery of the initial dissection. All the tissue was sent for permanent pathology, labeled \"right breast mass\". The wound was irrigated and rendered hemostatic. The wound area was assessed for contouring. No significant residual abnormal tissue remained in the base of the wound. The wound was closed in 2 layers with Vicryl and dressed with skin glue. The needle, sponge, and instrument counts were reported as correct. The patient was awakened and transported to recovery. His wife was notified of the findings and his status.         Jan Craig MD

## 2022-05-03 NOTE — ANESTHESIA PROCEDURE NOTES
Airway  Date/Time: 5/3/2022 7:32 AM  Urgency: elective      General Information and Staff    Patient location during procedure: OR  Anesthesiologist: John Rincon MD  Resident/CRNA: Josefina Fragoso CRNA  Performed: CRNA     Indications and Patient Condition  Indications for airway management: anesthesia  Sedation level: deep  Preoxygenated: yes  Patient position: sniffing  Mask difficulty assessment: 2 - vent by mask + OA or adjuvant +/- NMBA    Final Airway Details  Final airway type: endotracheal airway      Successful airway: ETT  Cuffed: yes   Successful intubation technique: direct laryngoscopy  Facilitating devices/methods: intubating stylet  Endotracheal tube insertion site: oral  Blade: GlideScope  Blade size: #4  ETT size (mm): 7.5    Cormack-Lehane Classification: grade I - full view of glottis  Placement verified by: chest auscultation and capnometry   Measured from: lips  ETT to lips (cm): 23  Number of attempts at approach: 1

## 2022-05-03 NOTE — INTERVAL H&P NOTE
Pre-op Diagnosis: Gynecomastia [N62]    The above referenced H&P was reviewed by Nimo Anderson MD on 5/3/2022, the patient was examined and no significant changes have occurred in the patient's condition since the H&P was performed. I discussed with the patient and/or legal representative the potential benefits, risks and side effects of this procedure; the likelihood of the patient achieving goals; and potential problems that might occur during recuperation. I discussed reasonable alternatives to the procedure, including risks, benefits and side effects related to the alternatives and risks related to not receiving this procedure. We will proceed with procedure as planned. He has tender right breast tissue/mass. The left side has been fine since excision. Right breast mass excision. Disc pseudocholinesterase deficiency and anesthesia. Right side initialed, assessed with the left side. Surgical plan and recovery disc w him and his wife.

## 2022-10-10 ENCOUNTER — HOSPITAL ENCOUNTER (OUTPATIENT)
Age: 67
Discharge: HOME | End: 2022-10-10
Attending: EMERGENCY MEDICINE
Payer: COMMERCIAL

## 2022-10-10 VITALS
DIASTOLIC BLOOD PRESSURE: 74 MMHG | OXYGEN SATURATION: 97 % | TEMPERATURE: 98 F | HEART RATE: 68 BPM | RESPIRATION RATE: 18 BRPM | SYSTOLIC BLOOD PRESSURE: 147 MMHG

## 2022-10-10 DIAGNOSIS — J40 BRONCHITIS: Primary | ICD-10-CM

## 2022-10-10 LAB — SARS-COV-2 RNA RESP QL NAA+PROBE: NOT DETECTED

## 2022-10-10 PROCEDURE — 99213 OFFICE O/P EST LOW 20 MIN: CPT

## 2022-10-10 RX ORDER — ALBUTEROL SULFATE 90 UG/1
2 AEROSOL, METERED RESPIRATORY (INHALATION) EVERY 4 HOURS PRN
Qty: 18 G | Refills: 0 | Status: SHIPPED | OUTPATIENT
Start: 2022-10-10 | End: 2022-11-09

## 2022-10-10 RX ORDER — BENZONATATE 100 MG/1
100 CAPSULE ORAL 3 TIMES DAILY PRN
Qty: 30 CAPSULE | Refills: 0 | Status: SHIPPED | OUTPATIENT
Start: 2022-10-10 | End: 2022-11-09

## 2022-10-10 NOTE — ED INITIAL ASSESSMENT (HPI)
Pt comes in for eval of cough and sore throat for 2 days. Also reports headache and feeling wheezy.  Denies runny nose, fever, chills, n/v.

## 2023-03-14 ENCOUNTER — HOSPITAL ENCOUNTER (OUTPATIENT)
Age: 68
Discharge: HOME | End: 2023-03-14
Payer: COMMERCIAL

## 2023-03-14 VITALS
SYSTOLIC BLOOD PRESSURE: 147 MMHG | RESPIRATION RATE: 18 BRPM | HEART RATE: 94 BPM | DIASTOLIC BLOOD PRESSURE: 83 MMHG | TEMPERATURE: 99 F | OXYGEN SATURATION: 95 %

## 2023-03-14 DIAGNOSIS — J06.9 VIRAL URI: Primary | ICD-10-CM

## 2023-03-14 LAB — S PYO AG THROAT QL IA.RAPID: NEGATIVE

## 2023-03-14 PROCEDURE — 99213 OFFICE O/P EST LOW 20 MIN: CPT

## 2023-03-14 PROCEDURE — 87651 STREP A DNA AMP PROBE: CPT | Performed by: NURSE PRACTITIONER

## 2023-03-14 RX ORDER — BENZONATATE 200 MG/1
200 CAPSULE ORAL 3 TIMES DAILY PRN
Qty: 30 CAPSULE | Refills: 0 | Status: SHIPPED | OUTPATIENT
Start: 2023-03-14 | End: 2023-04-13

## 2023-03-14 NOTE — ED INITIAL ASSESSMENT (HPI)
PATIENT ARRIVED AMBULATORY TO ROOM C/O SYMPTOMS THAT STARTED 3 DAYS AGO. +SORE THROAT +BODY ACHES SLIGHT COUGH. NO NASAL CONGESTION. +CHILLS. NO V/D. EASY NON LABORED RESPIRATIONS.

## 2023-03-14 NOTE — DISCHARGE INSTRUCTIONS
Albuterol inhaler 2 puffs every 4-6 hours as needed  Tessalon Perles 1 tablet every 8 hours as needed for cough  Push fluids, warm fluids  Cepacol lozenges for throat pain  Tylenol 650 mg every 6 hours for throat pain  Return for any new/worsening symptoms

## 2024-01-09 ENCOUNTER — APPOINTMENT (OUTPATIENT)
Dept: GENERAL RADIOLOGY | Age: 69
End: 2024-01-09
Attending: EMERGENCY MEDICINE
Payer: COMMERCIAL

## 2024-01-09 ENCOUNTER — HOSPITAL ENCOUNTER (OUTPATIENT)
Age: 69
Discharge: HOME | End: 2024-01-09
Attending: EMERGENCY MEDICINE
Payer: COMMERCIAL

## 2024-01-09 VITALS
RESPIRATION RATE: 20 BRPM | OXYGEN SATURATION: 95 % | TEMPERATURE: 98 F | SYSTOLIC BLOOD PRESSURE: 148 MMHG | DIASTOLIC BLOOD PRESSURE: 81 MMHG | HEART RATE: 82 BPM

## 2024-01-09 DIAGNOSIS — J98.01 ACUTE BRONCHOSPASM: ICD-10-CM

## 2024-01-09 DIAGNOSIS — J18.9 PNEUMONIA OF LEFT LOWER LOBE DUE TO INFECTIOUS ORGANISM: Primary | ICD-10-CM

## 2024-01-09 LAB
#MXD IC: 0.3 X10ˆ3/UL (ref 0.1–1)
ATRIAL RATE: 69 BPM
BUN BLD-MCNC: 25 MG/DL (ref 7–18)
CHLORIDE BLD-SCNC: 105 MMOL/L (ref 98–112)
CO2 BLD-SCNC: 27 MMOL/L (ref 21–32)
CREAT BLD-MCNC: 0.9 MG/DL
DDIMER WHOLE BLOOD: 338 NG/ML DDU (ref ?–400)
EGFRCR SERPLBLD CKD-EPI 2021: 94 ML/MIN/1.73M2 (ref 60–?)
GLUCOSE BLD-MCNC: 101 MG/DL (ref 70–99)
HCT VFR BLD AUTO: 42.7 %
HCT VFR BLD CALC: 44 %
HGB BLD-MCNC: 14.1 G/DL
ISTAT IONIZED CALCIUM FOR CHEM 8: 1.27 MMOL/L (ref 1.12–1.32)
LYMPHOCYTES # BLD AUTO: 2 X10ˆ3/UL (ref 1–4)
LYMPHOCYTES NFR BLD AUTO: 30.1 %
MCH RBC QN AUTO: 30.4 PG (ref 26–34)
MCHC RBC AUTO-ENTMCNC: 33 G/DL (ref 31–37)
MCV RBC AUTO: 92 FL (ref 80–100)
MIXED CELL %: 5 %
NEUTROPHILS # BLD AUTO: 4.5 X10ˆ3/UL (ref 1.5–7.7)
NEUTROPHILS NFR BLD AUTO: 64.9 %
P AXIS: 25 DEGREES
P-R INTERVAL: 166 MS
PLATELET # BLD AUTO: 262 X10ˆ3/UL (ref 150–450)
POTASSIUM BLD-SCNC: 4.7 MMOL/L (ref 3.6–5.1)
Q-T INTERVAL: 382 MS
QRS DURATION: 98 MS
QTC CALCULATION (BEZET): 409 MS
R AXIS: -28 DEGREES
RBC # BLD AUTO: 4.64 X10ˆ6/UL
SARS-COV-2 RNA RESP QL NAA+PROBE: NOT DETECTED
SODIUM BLD-SCNC: 139 MMOL/L (ref 136–145)
T AXIS: 20 DEGREES
TROPONIN I BLD-MCNC: <0.02 NG/ML
VENTRICULAR RATE: 69 BPM
WBC # BLD AUTO: 6.8 X10ˆ3/UL (ref 4–11)

## 2024-01-09 PROCEDURE — 71046 X-RAY EXAM CHEST 2 VIEWS: CPT | Performed by: EMERGENCY MEDICINE

## 2024-01-09 PROCEDURE — 93010 ELECTROCARDIOGRAM REPORT: CPT | Performed by: STUDENT IN AN ORGANIZED HEALTH CARE EDUCATION/TRAINING PROGRAM

## 2024-01-09 PROCEDURE — 36415 COLL VENOUS BLD VENIPUNCTURE: CPT

## 2024-01-09 PROCEDURE — 93005 ELECTROCARDIOGRAM TRACING: CPT

## 2024-01-09 PROCEDURE — 85378 FIBRIN DEGRADE SEMIQUANT: CPT | Performed by: EMERGENCY MEDICINE

## 2024-01-09 PROCEDURE — 85025 COMPLETE CBC W/AUTO DIFF WBC: CPT | Performed by: EMERGENCY MEDICINE

## 2024-01-09 PROCEDURE — 93010 ELECTROCARDIOGRAM REPORT: CPT

## 2024-01-09 PROCEDURE — 80047 BASIC METABLC PNL IONIZED CA: CPT

## 2024-01-09 PROCEDURE — 84484 ASSAY OF TROPONIN QUANT: CPT

## 2024-01-09 PROCEDURE — 99215 OFFICE O/P EST HI 40 MIN: CPT

## 2024-01-09 RX ORDER — ALBUTEROL SULFATE 90 UG/1
2 AEROSOL, METERED RESPIRATORY (INHALATION) EVERY 4 HOURS PRN
Qty: 1 EACH | Refills: 0 | Status: SHIPPED | OUTPATIENT
Start: 2024-01-09 | End: 2024-02-08

## 2024-01-09 RX ORDER — AZITHROMYCIN 250 MG/1
TABLET, FILM COATED ORAL
Qty: 6 TABLET | Refills: 0 | Status: SHIPPED | OUTPATIENT
Start: 2024-01-09 | End: 2024-01-14

## 2024-01-09 RX ORDER — AMOXICILLIN AND CLAVULANATE POTASSIUM 875; 125 MG/1; MG/1
1 TABLET, FILM COATED ORAL 2 TIMES DAILY
Qty: 20 TABLET | Refills: 0 | Status: SHIPPED | OUTPATIENT
Start: 2024-01-09 | End: 2024-01-19

## 2024-01-09 NOTE — ED INITIAL ASSESSMENT (HPI)
Patient arrives ambulatory with c/o cough since 12/25. Reports cough is so bad he is now having back pain and feels like he pulled something in his back. Coughing is waking him at night.

## 2024-01-09 NOTE — ED PROVIDER NOTES
Patient Seen in: [facility] [place]      History     Chief Complaint   Patient presents with    Cough/URI     Stated Complaint: cough/wheezing/congestion    Subjective:   HPI    Patient is a 68-year-old male with no significant past medical history who presents now with persistent cough, shortness of breath, back pain.  The patient states that he began experiencing a cough approximately 3 weeks ago.  Patient states the cough has persisted and over the last several days, the patient has developed more productive cough which is somewhat \"brown\" in color, especially when he first awakens in the morning.  The patient is also noted some wheezing and has had some shortness of breath over the last several days.  Over the last few days, the patient has developed right greater than left mid back pain which worsens when he coughs.  Patient does feel somewhat short of breath.  Patient denies any anterior chest pain.    Objective:   Past Medical History:   Diagnosis Date    Anesthesia complication     BACK PAIN     History of 2019 novel coronavirus disease (COVID-19) 04/2020    ASYMPTOMATIC,  NO HOSPITALIZATION    OSTEOARTHRITIS     Osteoarthritis     Pseudocholinesterase deficiency     HISTORY AND ALLERGIC TO ANECTINE    Psoriatic arthritis (HCC)     Visual impairment     GLASSES              Past Surgical History:   Procedure Laterality Date    COLONOSCOPY Right 2/21/2020    Procedure: COLONOSCOPY;  Surgeon: Pierre Peñaloza MD;  Location: [facility] ENDOSCOPY    EXCISIONAL BIOPSY LEFT  2019    gynecomastia    OTHER SURGICAL HISTORY      pancreatic cyst removed at age 12, knee arthroscopy, vocal cord ploypectomis                Social History     Socioeconomic History    Marital status:    Tobacco Use    Smoking status: Never    Smokeless tobacco: Never   Vaping Use    Vaping Use: Never used   Substance and Sexual Activity    Alcohol use: No     Alcohol/week: 0.0 standard drinks of alcohol    Drug use: Never     Comment:  advil 3 - 4 per day              Review of Systems    Positive for stated complaint: cough/wheezing/congestion  Other systems are as noted in HPI.  Constitutional and vital signs reviewed.      All other systems reviewed and negative except as noted above.    Physical Exam     ED Triage Vitals [01/09/24 1510]   /81   Pulse 82   Resp 20   Temp 98.2 °F (36.8 °C)   Temp src Temporal   SpO2 95 %   O2 Device        Current:/81   Pulse 82   Temp 98.2 °F (36.8 °C) (Temporal)   Resp 20   SpO2 95%         Physical Exam    Constitutional: Well-developed well-nourished in no acute distress  Head: Normocephalic, no swelling or tenderness  Eyes: Nonicteric sclera, no conjunctival injection  ENT: TMs are clear and flat bilaterally.  There is no posterior pharyngeal erythema  Chest: Mild bilateral wheezing, no tenderness  Cardiovascular: Regular rate and rhythm without murmur  Abdomen: Soft, nontender and nondistended  Neurologic: Patient is awake, alert and oriented ×3.  The patient's motor strength is 5 out of 5 and symmetric in the upper and lower extremities bilaterally  Extremities: No focal swelling or tenderness  Skin: No pallor, no redness or warmth to the touch      ED Course     Labs Reviewed   POCT ISTAT CHEM8 CARTRIDGE - Abnormal; Notable for the following components:       Result Value    ISTAT BUN 25 (*)     ISTAT Glucose 101 (*)     All other components within normal limits   D-DIMER (POC) - Normal   ISTAT TROPONIN - Normal   RAPID SARS-COV-2 BY PCR - Normal   POCT CBC     EKG    Rate, intervals and axes as noted on EKG Report.  Rate: 69  Rhythm: Sinus Rhythm  Reading: Patient's EKG demonstrates a sinus rhythm with a rate of 69.  The patient's axis and intervals are normal.  There is no acute ST elevation.                 Pulse ox is 95% on room air.  Vital signs are stable     Patient's x-ray was independently reviewed by myself.  The left lower lobe infiltrate was noted.    Patient's lab results  including normal electrolytes, normal CBC, negative COVID, normal troponin/D-dimer were reviewed with the patient.  Will initiate Augmentin/Zithromax for left lower lobe pneumonia.  Will provide with inhaler for bronchospasm.  Patient was offered work excuse, but declined stating that he \"has to work\".    MDM      shortness of breath including pulmonary infectious process, COPD, asthma, pulmonary embolus and congestive heart failure                                     Medical Decision Making      Disposition and Plan     Clinical Impression:  1. Pneumonia of left lower lobe due to infectious organism    2. Acute bronchospasm         Disposition:  Discharge  1/9/2024  3:56 pm    Follow-up:  Mani Teran MD  [address] [facility]  [address] [place] [address]  [phone]    In 3 days  If symptoms worsen          Medications Prescribed:  Discharge Medication List as of 1/9/2024  4:02 PM        START taking these medications    Details   amoxicillin clavulanate 875-125 MG Oral Tab Take 1 tablet by mouth 2 (two) times daily for 10 days., Normal, Disp-20 tablet, R-0      azithromycin (ZITHROMAX Z-HANNA) 250 MG Oral Tab 500 mg once followed by 250 mg daily x 4 days, Normal, Disp-6 tablet, R-0      albuterol 108 (90 Base) MCG/ACT Inhalation Aero Soln Inhale 2 puffs into the lungs every 4 (four) hours as needed for Wheezing., Normal, Disp-1 each, R-0

## 2024-02-26 ENCOUNTER — HOSPITAL ENCOUNTER (OUTPATIENT)
Age: 69
Discharge: HOME | End: 2024-02-26
Payer: COMMERCIAL

## 2024-02-26 ENCOUNTER — APPOINTMENT (OUTPATIENT)
Dept: GENERAL RADIOLOGY | Age: 69
End: 2024-02-26
Attending: NURSE PRACTITIONER
Payer: COMMERCIAL

## 2024-02-26 VITALS
DIASTOLIC BLOOD PRESSURE: 79 MMHG | HEART RATE: 71 BPM | SYSTOLIC BLOOD PRESSURE: 151 MMHG | OXYGEN SATURATION: 98 % | TEMPERATURE: 98 F | RESPIRATION RATE: 24 BRPM

## 2024-02-26 DIAGNOSIS — H10.33 ACUTE CONJUNCTIVITIS OF BOTH EYES, UNSPECIFIED ACUTE CONJUNCTIVITIS TYPE: ICD-10-CM

## 2024-02-26 DIAGNOSIS — J40 BRONCHITIS: Primary | ICD-10-CM

## 2024-02-26 PROCEDURE — 99214 OFFICE O/P EST MOD 30 MIN: CPT

## 2024-02-26 PROCEDURE — 71046 X-RAY EXAM CHEST 2 VIEWS: CPT | Performed by: NURSE PRACTITIONER

## 2024-02-26 PROCEDURE — 99213 OFFICE O/P EST LOW 20 MIN: CPT

## 2024-02-26 RX ORDER — CIPROFLOXACIN HYDROCHLORIDE 3.5 MG/ML
2 SOLUTION/ DROPS TOPICAL 4 TIMES DAILY
Qty: 5 ML | Refills: 0 | Status: SHIPPED | OUTPATIENT
Start: 2024-02-26 | End: 2024-03-02

## 2024-02-26 RX ORDER — PREDNISONE 20 MG/1
40 TABLET ORAL DAILY
Qty: 10 TABLET | Refills: 0 | Status: SHIPPED | OUTPATIENT
Start: 2024-02-26 | End: 2024-03-02

## 2024-02-26 RX ORDER — BENZONATATE 100 MG/1
100 CAPSULE ORAL 3 TIMES DAILY PRN
Qty: 30 CAPSULE | Refills: 0 | Status: SHIPPED | OUTPATIENT
Start: 2024-02-26 | End: 2024-03-27

## 2024-02-26 NOTE — ED PROVIDER NOTES
Patient Seen in: [facility] [place]      History     Chief Complaint   Patient presents with    Cough     Stated Complaint: cough and eye discharge    Subjective:   HPI    68-year-old male presents to the immediate care with persistent coughing for the last month.  He states that he otherwise feels well no fever no chest pain or shortness of breath.  He did have pneumonia in January 9 and finished a course of antibiotics x 2.  He does have an albuterol inhaler at home but has not used it.  Cough is worse at night.  No swelling to his legs.  He is eating and drinking well.  No fever.  He states he also has redness and drainage to both eyes and is granddaughter has conjunctivitis.    Objective:   Past Medical History:   Diagnosis Date    Anesthesia complication     BACK PAIN     History of 2019 novel coronavirus disease (COVID-19) 04/2020    ASYMPTOMATIC,  NO HOSPITALIZATION    OSTEOARTHRITIS     Osteoarthritis     Pseudocholinesterase deficiency     HISTORY AND ALLERGIC TO ANECTINE    Psoriatic arthritis (HCC)     Visual impairment     GLASSES              Past Surgical History:   Procedure Laterality Date    COLONOSCOPY Right 2/21/2020    Procedure: COLONOSCOPY;  Surgeon: Pierre Peñaloza MD;  Location: [facility] ENDOSCOPY    EXCISIONAL BIOPSY LEFT  2019    gynecomastia    OTHER SURGICAL HISTORY      pancreatic cyst removed at age 12, knee arthroscopy, vocal cord ploypectomis                Social History     Socioeconomic History    Marital status:    Tobacco Use    Smoking status: Never    Smokeless tobacco: Never   Vaping Use    Vaping Use: Never used   Substance and Sexual Activity    Alcohol use: No     Alcohol/week: 0.0 standard drinks of alcohol    Drug use: Never     Comment: advil 3 - 4 per day              Review of Systems    Positive for stated complaint: cough and eye discharge  Other systems are as noted in HPI.  Constitutional and vital signs reviewed.      All other systems reviewed and negative  except as noted above.    Physical Exam     ED Triage Vitals [02/26/24 1106]   /79   Pulse 71   Resp 24   Temp 98.2 °F (36.8 °C)   Temp src Temporal   SpO2 98 %   O2 Device None (Room air)       Current:/79   Pulse 71   Temp 98.2 °F (36.8 °C) (Temporal)   Resp 24   SpO2 98%         Physical Exam  Vitals and nursing note reviewed.   Constitutional:       Appearance: Normal appearance. He is well-developed. He is obese.   HENT:      Head: Normocephalic.   Eyes:      General:         Right eye: Discharge present.         Left eye: Discharge present.     Conjunctiva/sclera: Conjunctivae normal.   Cardiovascular:      Rate and Rhythm: Normal rate.      Heart sounds: No murmur heard.  Pulmonary:      Effort: Pulmonary effort is normal.      Breath sounds: Rhonchi present. No wheezing.      Comments: Harsh congested cough  Abdominal:      Palpations: Abdomen is soft.      Tenderness: There is no abdominal tenderness.   Musculoskeletal:         General: No tenderness.      Cervical back: Neck supple.   Skin:     Findings: No rash.   Neurological:      Mental Status: He is alert and oriented to person, place, and time.             ED Course   Labs Reviewed - No data to display          Chest x-ray rule out pneumonia         MDM      Differentials include but not limited to influenza versus URI versus COVID versus pneumonia, cardiac causes CHF  COVID declines home negative cough x 1 month  I personally reviewed the chest x-ray there is no obvious pneumonia will await radiology read  Supportive care: Run humidifier, increase fluids, elevate HOB, NSAIDs, Tylenol frequent nasal clearing, saline spray/steam showers. Educated on worsening signs and symptoms of illness.   Mucinex/expectorant  Close PMD follow-up advised if sx persist  All vital signs are stable/sats appropriate on room air  Plan dc home to Follow-up with pmd/return to the immediate care for any new or worsening symptoms at any time                                          Medical Decision Making  Amount and/or Complexity of Data Reviewed  Radiology: ordered and independent interpretation performed. Decision-making details documented in ED Course.    Risk  OTC drugs.  Prescription drug management.        Disposition and Plan     Clinical Impression:  1. Bronchitis    2. Acute conjunctivitis of both eyes, unspecified acute conjunctivitis type         Disposition:  Discharge  2/26/2024 12:11 pm    Follow-up:  Mani Teran MD  [address] [place] [address]  [phone]    Schedule an appointment as soon as possible for a visit in 1 week  If symptoms worsen go to the ER          Medications Prescribed:  Current Discharge Medication List        START taking these medications    Details   benzonatate 100 MG Oral Cap Take 1 capsule (100 mg total) by mouth 3 (three) times daily as needed for cough.  Qty: 30 capsule, Refills: 0      predniSONE 20 MG Oral Tab Take 2 tablets (40 mg total) by mouth daily for 5 days.  Qty: 10 tablet, Refills: 0      ciprofloxacin 0.3 % Ophthalmic Solution Place 2 drops into both eyes 4 (four) times daily for 5 days.  Qty: 5 mL, Refills: 0

## 2024-02-26 NOTE — DISCHARGE INSTRUCTIONS
Tylenol/ibuprofen as needed for pain and fever  Supportive care: Run humidifier, increase fluids, elevate head of bed  frequent nasal clearing, saline spray/steam showers.   Follow-up with your doctor as needed return for any new or worsening symptoms at any time

## 2024-02-26 NOTE — ED INITIAL ASSESSMENT (HPI)
Patient arrived ambulatory to room c/o a productive cough x4 weeks. No nasal congestion. +drainage from bilateral eyes this morning. No vision changes. No contact lens use. No fevers. Easy non labored respirations.

## 2024-07-08 PROBLEM — H93.11 TINNITUS OF RIGHT EAR: Status: ACTIVE | Noted: 2021-03-22

## 2024-07-08 PROBLEM — H90.5 SENSORINEURAL HEARING LOSS (SNHL) OF RIGHT EAR: Status: ACTIVE | Noted: 2021-03-22

## 2024-07-09 ENCOUNTER — OFFICE VISIT (OUTPATIENT)
Dept: FAMILY MEDICINE CLINIC | Facility: CLINIC | Age: 69
End: 2024-07-09
Payer: COMMERCIAL

## 2024-07-09 VITALS
SYSTOLIC BLOOD PRESSURE: 118 MMHG | BODY MASS INDEX: 45.61 KG/M2 | TEMPERATURE: 98 F | DIASTOLIC BLOOD PRESSURE: 80 MMHG | OXYGEN SATURATION: 95 % | HEIGHT: 69.5 IN | WEIGHT: 315 LBS | HEART RATE: 66 BPM

## 2024-07-09 DIAGNOSIS — Z28.21 IMMUNIZATION DECLINED: ICD-10-CM

## 2024-07-09 DIAGNOSIS — Z12.5 SCREENING PSA (PROSTATE SPECIFIC ANTIGEN): ICD-10-CM

## 2024-07-09 DIAGNOSIS — H90.5 SENSORINEURAL HEARING LOSS (SNHL) OF RIGHT EAR, UNSPECIFIED HEARING STATUS ON CONTRALATERAL SIDE: ICD-10-CM

## 2024-07-09 DIAGNOSIS — E66.01 MORBID OBESITY WITH BMI OF 50.0-59.9, ADULT (HCC): ICD-10-CM

## 2024-07-09 DIAGNOSIS — L40.50 PSORIATIC ARTHRITIS (HCC): ICD-10-CM

## 2024-07-09 DIAGNOSIS — Z12.5 SCREENING FOR PROSTATE CANCER: ICD-10-CM

## 2024-07-09 DIAGNOSIS — Z00.00 ROUTINE MEDICAL EXAM: Primary | ICD-10-CM

## 2024-07-09 DIAGNOSIS — J98.4 RESTRICTIVE LUNG DISEASE: ICD-10-CM

## 2024-07-09 DIAGNOSIS — M17.0 OSTEOARTHRITIS OF BOTH KNEES, UNSPECIFIED OSTEOARTHRITIS TYPE: ICD-10-CM

## 2024-07-09 PROCEDURE — 3074F SYST BP LT 130 MM HG: CPT | Performed by: STUDENT IN AN ORGANIZED HEALTH CARE EDUCATION/TRAINING PROGRAM

## 2024-07-09 PROCEDURE — 3079F DIAST BP 80-89 MM HG: CPT | Performed by: STUDENT IN AN ORGANIZED HEALTH CARE EDUCATION/TRAINING PROGRAM

## 2024-07-09 PROCEDURE — 3008F BODY MASS INDEX DOCD: CPT | Performed by: STUDENT IN AN ORGANIZED HEALTH CARE EDUCATION/TRAINING PROGRAM

## 2024-07-09 PROCEDURE — 99387 INIT PM E/M NEW PAT 65+ YRS: CPT | Performed by: STUDENT IN AN ORGANIZED HEALTH CARE EDUCATION/TRAINING PROGRAM

## 2024-07-09 RX ORDER — GUSELKUMAB 100 MG/ML
1 INJECTION SUBCUTANEOUS
COMMUNITY
Start: 2024-06-07 | End: 2024-09-05
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
## 2024-07-10 ENCOUNTER — LAB ENCOUNTER (OUTPATIENT)
Dept: LAB | Facility: REFERENCE LAB | Age: 69
End: 2024-07-10
Attending: STUDENT IN AN ORGANIZED HEALTH CARE EDUCATION/TRAINING PROGRAM
Payer: COMMERCIAL

## 2024-07-10 DIAGNOSIS — Z12.5 SCREENING PSA (PROSTATE SPECIFIC ANTIGEN): ICD-10-CM

## 2024-07-10 DIAGNOSIS — Z00.00 ROUTINE MEDICAL EXAM: ICD-10-CM

## 2024-07-10 DIAGNOSIS — Z12.5 SCREENING FOR PROSTATE CANCER: ICD-10-CM

## 2024-07-10 DIAGNOSIS — E66.01 MORBID OBESITY WITH BMI OF 50.0-59.9, ADULT (HCC): ICD-10-CM

## 2024-07-10 LAB
ALBUMIN SERPL-MCNC: 4.2 G/DL (ref 3.2–4.8)
ALBUMIN/GLOB SERPL: 1.6 {RATIO} (ref 1–2)
ALP LIVER SERPL-CCNC: 74 U/L
ALT SERPL-CCNC: 20 U/L
ANION GAP SERPL CALC-SCNC: 4 MMOL/L (ref 0–18)
AST SERPL-CCNC: 17 U/L (ref ?–34)
BILIRUB SERPL-MCNC: 0.8 MG/DL (ref 0.2–1.1)
BUN BLD-MCNC: 14 MG/DL (ref 9–23)
BUN/CREAT SERPL: 17.1 (ref 10–20)
CALCIUM BLD-MCNC: 9.5 MG/DL (ref 8.7–10.4)
CHLORIDE SERPL-SCNC: 108 MMOL/L (ref 98–112)
CHOLEST SERPL-MCNC: 211 MG/DL (ref ?–200)
CO2 SERPL-SCNC: 29 MMOL/L (ref 21–32)
COMPLEXED PSA SERPL-MCNC: 0.48 NG/ML (ref ?–4)
CREAT BLD-MCNC: 0.82 MG/DL
DEPRECATED RDW RBC AUTO: 47.9 FL (ref 35.1–46.3)
EGFRCR SERPLBLD CKD-EPI 2021: 97 ML/MIN/1.73M2 (ref 60–?)
ERYTHROCYTE [DISTWIDTH] IN BLOOD BY AUTOMATED COUNT: 13.5 % (ref 11–15)
EST. AVERAGE GLUCOSE BLD GHB EST-MCNC: 105 MG/DL (ref 68–126)
FASTING PATIENT LIPID ANSWER: YES
FASTING STATUS PATIENT QL REPORTED: YES
GLOBULIN PLAS-MCNC: 2.6 G/DL (ref 2–3.5)
GLUCOSE BLD-MCNC: 83 MG/DL (ref 70–99)
HBA1C MFR BLD: 5.3 % (ref ?–5.7)
HCT VFR BLD AUTO: 42.6 %
HDLC SERPL-MCNC: 45 MG/DL (ref 40–59)
HGB BLD-MCNC: 14.1 G/DL
LDLC SERPL CALC-MCNC: 148 MG/DL (ref ?–100)
MCH RBC QN AUTO: 31.6 PG (ref 26–34)
MCHC RBC AUTO-ENTMCNC: 33.1 G/DL (ref 31–37)
MCV RBC AUTO: 95.5 FL
NONHDLC SERPL-MCNC: 166 MG/DL (ref ?–130)
OSMOLALITY SERPL CALC.SUM OF ELEC: 292 MOSM/KG (ref 275–295)
PLATELET # BLD AUTO: 210 10(3)UL (ref 150–450)
POTASSIUM SERPL-SCNC: 4.6 MMOL/L (ref 3.5–5.1)
PROT SERPL-MCNC: 6.8 G/DL (ref 5.7–8.2)
RBC # BLD AUTO: 4.46 X10(6)UL
SODIUM SERPL-SCNC: 141 MMOL/L (ref 136–145)
TRIGL SERPL-MCNC: 100 MG/DL (ref 30–149)
TSI SER-ACNC: 1.6 MIU/ML (ref 0.55–4.78)
VLDLC SERPL CALC-MCNC: 19 MG/DL (ref 0–30)
WBC # BLD AUTO: 6.1 X10(3) UL (ref 4–11)

## 2024-07-10 PROCEDURE — 83036 HEMOGLOBIN GLYCOSYLATED A1C: CPT | Performed by: STUDENT IN AN ORGANIZED HEALTH CARE EDUCATION/TRAINING PROGRAM

## 2024-07-10 PROCEDURE — 84153 ASSAY OF PSA TOTAL: CPT | Performed by: STUDENT IN AN ORGANIZED HEALTH CARE EDUCATION/TRAINING PROGRAM

## 2024-07-10 PROCEDURE — 80050 GENERAL HEALTH PANEL: CPT | Performed by: STUDENT IN AN ORGANIZED HEALTH CARE EDUCATION/TRAINING PROGRAM

## 2024-07-10 PROCEDURE — 80061 LIPID PANEL: CPT | Performed by: STUDENT IN AN ORGANIZED HEALTH CARE EDUCATION/TRAINING PROGRAM

## 2024-07-22 ENCOUNTER — OFFICE VISIT (OUTPATIENT)
Dept: SURGERY | Facility: CLINIC | Age: 69
End: 2024-07-22
Payer: COMMERCIAL

## 2024-07-22 VITALS
WEIGHT: 315 LBS | HEIGHT: 68.5 IN | DIASTOLIC BLOOD PRESSURE: 70 MMHG | BODY MASS INDEX: 47.19 KG/M2 | HEART RATE: 68 BPM | SYSTOLIC BLOOD PRESSURE: 118 MMHG | OXYGEN SATURATION: 96 %

## 2024-07-22 DIAGNOSIS — Z51.81 ENCOUNTER FOR THERAPEUTIC DRUG MONITORING: ICD-10-CM

## 2024-07-22 DIAGNOSIS — E66.01 MORBID OBESITY WITH BMI OF 50.0-59.9, ADULT (HCC): ICD-10-CM

## 2024-07-22 DIAGNOSIS — E78.5 DYSLIPIDEMIA: Primary | ICD-10-CM

## 2024-07-22 PROCEDURE — 3078F DIAST BP <80 MM HG: CPT | Performed by: NURSE PRACTITIONER

## 2024-07-22 PROCEDURE — 99204 OFFICE O/P NEW MOD 45 MIN: CPT | Performed by: NURSE PRACTITIONER

## 2024-07-22 PROCEDURE — 3008F BODY MASS INDEX DOCD: CPT | Performed by: NURSE PRACTITIONER

## 2024-07-22 PROCEDURE — 3074F SYST BP LT 130 MM HG: CPT | Performed by: NURSE PRACTITIONER

## 2024-07-22 RX ORDER — SEMAGLUTIDE 0.25 MG/.5ML
0.25 INJECTION, SOLUTION SUBCUTANEOUS WEEKLY
Qty: 4 EACH | Refills: 1 | Status: SHIPPED | OUTPATIENT
Start: 2024-07-22

## 2024-07-22 RX ORDER — TOPIRAMATE 25 MG/1
25 TABLET ORAL 2 TIMES DAILY
Qty: 60 TABLET | Refills: 3 | Status: SHIPPED | OUTPATIENT
Start: 2024-07-22

## 2024-07-22 NOTE — PATIENT INSTRUCTIONS
1 MD probiotic.     Start 25 mg topiramate in the evening for cravings and to assist weight loss. Increase to twice daily as tolerated.     Will check [employer] coverage.     Meet with RD.     Attend bariatric seminar.  To schedule bariatric seminar:   Call [phone] or   Schedule Online at- [email or web address]    After you attend seminar, please reach out to the surgeon's office to have your insurance benefits verified.   The number to call is [phone] and the office can direct you from there.   The surgeon's office will not verify your benefits until you have attended seminar. Please do not call the surgeon until after seminar.     SLEEVE GASTRECTOMY  80% of patients reach at least 255 lbs 18 months after surgery.  50% of patients reach at least 232 lbs 18 months after surgery.  20% of patients reach 209 lbs 18 months after surgery.    GASTRIC BYPASS  80% of patients reach at least 233 lbs 18 months after surgery.  50% of patients reach at least 212 lbs 18 months after surgery.  20% of patients reach 191 lbs 18 months after surgery.    Add Magnesium glycinate 200 to 500 mg/day for sleep.   Life Extension. NOW. Garden of Life. Whole Food Brand. [employer]'s. *Pure Encapsulations.     Or consider Natural Calm.   by Natural Vitality  Follow dosage instructions.  Start 1 teaspoon and increase up to 3 teaspoons as needed for sleep.    Aim for a total of:  2 fruits a day (avocado, tomato, citrus/oranges, apples, berries)  1/2 cup or medium size    4 non-starchy vegetables/day (1/2 cup cooked; 1 cup raw) (greens, peppers, onions, garlic, broccoli, cauliflower, brussels sprouts, asparagus, etc.)    0-2 starches/day (oatmeal, sweet potato, carrots, brown rice, etc).    3 protein per day (fish, seafood, meat, or plants: salmon, nuts, seeds, shrimp, chicken, turkey, beans, lentils, chickpeas, etc).    2 healthy fats (avocado, avocado oil, olives, olive oil, salmon, nuts, seeds)    I recommend a whole  food, plant powered diet with low glycemic index:     Aim for 3 meals a day and 1-2 snacks as needed.    Aim for a protein + produce at each meal time.     Breakfast ideas:  1. Fruit and nuts/seeds.  2. Eggs scrambled with vegetables.  3. Oatmeal (stovetop), cinnamon, 2 tbsp flaxseed, berries, nuts.  4. Protein shake + fruit. (1 scoop with water/ice). Garden of Life Fit, Nutiva, Moreno, and Orgain are good brands.      Snacks:  Raw vegetables and hummus, apples and peanut butter, nuts, seeds, fruit, pecans drizzled with organic honey.      Use the Healthy Plate method for lunch and dinner:  1/2 right side of plate non-starchy vegetables.  Bottom left 1/4 plate protein.  Top left 1/4 starch as desired.      Aim for 150 minutes moderate level exercise weekly with 2-3 days strength training.
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
## 2024-07-25 ENCOUNTER — TELEPHONE (OUTPATIENT)
Dept: SURGERY | Facility: CLINIC | Age: 69
End: 2024-07-25

## 2024-07-25 NOTE — TELEPHONE ENCOUNTER
PA submitted for medication Wegovy, Denied. Weight loss medication not covered under patient's pharmacy benefit.

## 2024-08-05 ENCOUNTER — HOSPITAL ENCOUNTER (OUTPATIENT)
Dept: CT IMAGING | Age: 69
Discharge: HOME | End: 2024-08-05
Attending: STUDENT IN AN ORGANIZED HEALTH CARE EDUCATION/TRAINING PROGRAM

## 2024-08-05 DIAGNOSIS — E66.01 MORBID OBESITY WITH BMI OF 50.0-59.9, ADULT (HCC): ICD-10-CM

## 2024-08-06 DIAGNOSIS — I77.819 ECTATIC AORTA (HCC): Primary | ICD-10-CM

## 2024-09-12 ENCOUNTER — OFFICE VISIT (OUTPATIENT)
Facility: CLINIC | Age: 69
End: 2024-09-12
Payer: COMMERCIAL

## 2024-09-12 VITALS — WEIGHT: 315 LBS | BODY MASS INDEX: 47.19 KG/M2 | HEIGHT: 68.5 IN | RESPIRATION RATE: 20 BRPM

## 2024-09-12 DIAGNOSIS — I77.810 AORTIC ECTASIA, THORACIC (HCC): Primary | ICD-10-CM

## 2024-09-12 RX ORDER — IBUPROFEN 200 MG
600 TABLET ORAL AS NEEDED
COMMUNITY

## 2024-09-14 NOTE — PROGRESS NOTES
Samer F. Najjar, MD  Vascular Surgery  [facility]      VASCULAR SURGERY   CLINIC CONSULT NOTE        Name: Chris Martini   :   10/23/1955  [id number]     REFERRING PHYSICIAN:  Lina Donahue  PRIMARY CARE PHYSICIAN:  Mani Teran MD    HISTORY OF PRESENT ILLNESS:   Patient is a 68 year old male who has been referred regarding assessment of his ascending aortic ectasia that measured 4 cm with minor calcifications of the aortic root that was seen on a recent calcium score CT scan.  His calcium score was 0.  He was also noted to have multiple small pulmonary nodules.  The patient does not have a smoking history.  I have known the patient because I have treated his wife in the past.    PAST MEDICAL HISTORY:    Past Medical History:    Anesthesia complication    BACK PAIN    History of 2019 novel coronavirus disease (COVID-19)    ASYMPTOMATIC,  NO HOSPITALIZATION    OSTEOARTHRITIS    Osteoarthritis    Pseudocholinesterase deficiency    HISTORY AND ALLERGIC TO ANECTINE    Psoriatic arthritis (HCC)    Visual impairment    GLASSES       PAST SURGICAL HISTORY:   Past Surgical History:   Procedure Laterality Date    Colonoscopy Right 2020    Procedure: COLONOSCOPY;  Surgeon: Pierre Peñaloza MD;  Location: [facility] ENDOSCOPY    Excisional biopsy left  2019    gynecomastia    Other surgical history      pancreatic cyst removed at age 12, knee arthroscopy, vocal cord ploypectomis        MEDICATIONS:     Current Outpatient Medications:     ibuprofen (ADVIL) 200 MG Oral Tab, Take 3 tablets (600 mg total) by mouth as needed for Pain., Disp: , Rfl:     semaglutide-weight management (WEGOVY) 0.25 MG/0.5ML Subcutaneous Solution Auto-injector, Inject 0.5 mL (0.25 mg total) into the skin once a week., Disp: 4 each, Rfl: 1    topiramate 25 MG Oral Tab, Take 1 tablet (25 mg total) by mouth 2 (two) times daily., Disp: 60 tablet, Rfl: 3    ALLERGIES:    He is allergic to anectine and succinylcholine.    SOCIAL  HISTORY:    Patient  reports that he has never smoked. He has never used smokeless tobacco. He reports that he does not drink alcohol and does not use drugs.    FAMILY HISTORY:    Patient's family history includes Cancer in his father; Other in his son.    ROS:     A 12 point review of systems with pertinent positives and negatives listed in the HPI.    EXAM:    Resp 20   Ht 5' 8.5\" (1.74 m)   Wt (!) 354 lb (160.6 kg)   BMI 53.04 kg/m²   GENERAL: alert and orientated X 3, well developed, well nourished, in no apparent distress  PSYCH: normal mood and affect  HEENT: ears and throat are clear  NECK: supple, no lymphadenopathy, thyroid wnl  CAROTID: no bruits  RESPIRATORY: no rales, rhonchi, or wheezes B  CARDIO: RRR without murmur, no murmur, no gallop   ABDOMEN: soft, non-tender with no palpable aneurysm or masses  BACK: normal, no tenderness  SKIN: no rashes, warm and dry  EXTREMITIES: no tenderness  NEURO: no sensory or motor deficits  VASCULAR:      Femoral Popliteal DP PT Peroneal   Right 2+       palpable, strong palpable, strong    Left 2+       palpable, strong palpable, strong        LABS:   Lab Results   Component Value Date     07/10/2024    A1C 5.3 07/10/2024      Lab Results   Component Value Date    GLU 83 07/10/2024    BUN 14 07/10/2024    CREATSERUM 0.82 07/10/2024    BUNCREA 17.1 07/10/2024    ANIONGAP 4 07/10/2024    CA 9.5 07/10/2024     07/10/2024    K 4.6 07/10/2024     07/10/2024    CO2 29.0 07/10/2024    OSMOCALC 292 07/10/2024      Lab Results   Component Value Date    WBC 6.1 07/10/2024    RBC 4.46 07/10/2024    HGB 14.1 07/10/2024    HCT 42.6 07/10/2024    MCV 95.5 07/10/2024    MCH 31.6 07/10/2024    MCHC 33.1 07/10/2024    RDW 13.5 07/10/2024    .0 07/10/2024        Lab Results   Component Value Date    HGB 14.1 07/10/2024    HGB 13.7 04/07/2022    HGB 13.0 07/27/2020    HGB 13.7 02/22/2019    HGB 14.0 03/27/2018    CREATSERUM 0.82 07/10/2024    CREATSERUM 0.81  04/07/2022    CREATSERUM 0.80 11/17/2020    CREATSERUM 0.81 02/11/2020    CREATSERUM 0.87 02/22/2019    CREATSERUM 0.81 03/27/2018         ASSESSMENT/PLAN:  I have reviewed the patient's prior documentation and studies from [facility] and from my chart.  Diagnoses and all orders for this visit:    Aortic ectasia, thoracic (HCC)       I explained to the patient is ascending aortic ectasia does not require treatment at this size but this will need to be monitored ideally by cardiologist using echo or perhaps a repeat CT scan on a yearly basis.  This is outside the area of my expertise as I only treat descending thoracic disease.  I have asked him to contact his primary care physician to set this up.  His pulmonary nodules were small in size but perhaps he would benefit from routine monitoring either by his primary care physician or by a pulmonologist.  There is no need to follow-up with me.          The patient indicated an understanding of these issues and agreed to the plan and all questions were answered during the clinic visit.      Thank you for allowing me to participate in your patient's care.   Please do not hesitate to contact me with any questions.    Sincerely,  Samer F. Najjar, MD    Please note: Dragon speech recognition software was used to prepare this note. If a word or phrase is confusing, it is likely do to a failure of recognition.   Please contact me with any questions or clarifications.

## 2024-10-21 DIAGNOSIS — E66.01 MORBID OBESITY WITH BMI OF 50.0-59.9, ADULT (HCC): Primary | ICD-10-CM

## 2024-10-21 RX ORDER — TIRZEPATIDE 2.5 MG/.5ML
2.5 INJECTION, SOLUTION SUBCUTANEOUS WEEKLY
Qty: 2 ML | Refills: 1 | Status: SHIPPED | OUTPATIENT
Start: 2024-10-21

## 2024-10-24 DIAGNOSIS — E66.01 MORBID OBESITY WITH BMI OF 50.0-59.9, ADULT (HCC): Primary | ICD-10-CM

## 2024-10-24 RX ORDER — TIRZEPATIDE 2.5 MG/.5ML
2.5 INJECTION, SOLUTION SUBCUTANEOUS WEEKLY
Qty: 2 ML | Refills: 1 | Status: SHIPPED | OUTPATIENT
Start: 2024-10-24

## 2024-10-24 NOTE — PROGRESS NOTES
Spoke to patient. He prefers to start OOP Zepbound at this time over compounded option. Discussed risks, benefits, rationale, and side effects of medication(s). All questions answered.

## 2024-12-10 DIAGNOSIS — E66.01 MORBID OBESITY WITH BMI OF 50.0-59.9, ADULT (HCC): ICD-10-CM

## 2024-12-12 RX ORDER — TIRZEPATIDE 2.5 MG/.5ML
2.5 INJECTION, SOLUTION SUBCUTANEOUS WEEKLY
Qty: 2 ML | Refills: 0 | Status: SHIPPED | OUTPATIENT
Start: 2024-12-12

## 2024-12-19 RX ORDER — TIRZEPATIDE 5 MG/.5ML
5 INJECTION, SOLUTION SUBCUTANEOUS WEEKLY
Qty: 2 ML | Refills: 2 | Status: SHIPPED | OUTPATIENT
Start: 2024-12-19

## 2025-01-17 ENCOUNTER — TELEPHONE (OUTPATIENT)
Dept: SURGERY | Facility: CLINIC | Age: 70
End: 2025-01-17

## 2025-01-20 ENCOUNTER — OFFICE VISIT (OUTPATIENT)
Dept: SURGERY | Facility: CLINIC | Age: 70
End: 2025-01-20
Payer: COMMERCIAL

## 2025-01-20 VITALS
HEART RATE: 71 BPM | BODY MASS INDEX: 47.19 KG/M2 | DIASTOLIC BLOOD PRESSURE: 80 MMHG | HEIGHT: 68.5 IN | OXYGEN SATURATION: 99 % | SYSTOLIC BLOOD PRESSURE: 128 MMHG | WEIGHT: 315 LBS

## 2025-01-20 DIAGNOSIS — Z51.81 ENCOUNTER FOR THERAPEUTIC DRUG MONITORING: ICD-10-CM

## 2025-01-20 DIAGNOSIS — E66.01 MORBID OBESITY WITH BMI OF 50.0-59.9, ADULT (HCC): ICD-10-CM

## 2025-01-20 DIAGNOSIS — E78.5 DYSLIPIDEMIA: Primary | ICD-10-CM

## 2025-01-20 PROCEDURE — 3074F SYST BP LT 130 MM HG: CPT | Performed by: NURSE PRACTITIONER

## 2025-01-20 PROCEDURE — 3079F DIAST BP 80-89 MM HG: CPT | Performed by: NURSE PRACTITIONER

## 2025-01-20 PROCEDURE — 99214 OFFICE O/P EST MOD 30 MIN: CPT | Performed by: NURSE PRACTITIONER

## 2025-01-20 PROCEDURE — 3008F BODY MASS INDEX DOCD: CPT | Performed by: NURSE PRACTITIONER

## 2025-01-21 NOTE — PATIENT INSTRUCTIONS
Compound tirzepatide is a custom-made medication that mimics your natural GLP-1 and GIP hormones.   It is used to treat type 2 diabetes, unhealthy weight gain, and sleep apnea. It works by increasing insulin release, lowering glucagon release, delaying gastric emptying and reducing appetite. Compound tirzepatide is prescribed when an FDA-approved medication, dose, or dosage form is unavailable (ie. Nationwide shortage or no obesity coverage for GLP-1 meds). The cost of these medications is approximately $400 dollars monthly based on dose.     This is the pharmacy we use:    [facility]  [address]  [place] [address]  Phone: [phone] hours  Pharmacy: Mon - Fri 7:30AM - 7:30PM     Call Center: Mon - Fri 7:00AM - 7:00PM    Instructions: once you receive the medicine, you will inject into the abdomen subcutaneously weekly subcutaneous weekly into abdominal fat or inner thigh/top of thigh fat. Please do not inject into the same area repeatedly, you must change the injection site each time.     It can take approximately 5 days for the prescription to process.    Thank you,  HERBERT Groves
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
## 2025-01-31 ENCOUNTER — TELEPHONE (OUTPATIENT)
Dept: FAMILY MEDICINE CLINIC | Facility: CLINIC | Age: 70
End: 2025-01-31

## 2025-01-31 NOTE — TELEPHONE ENCOUNTER
RTKA on 02/17/25 with Dr. Chance @     H&P- done 02/04/25  Labs- RDW-SD (47.9), calc osmo (296), MRSA neg, all other labs WNL  EKG- sinus bradycardia, nonspecific ST abnormality. When compared with EKG on 06/09/24 no significant change was found  X-ray- done 02/26/24 WNL    Rheumatology- Dr. Olga Padilla  P- [phone]  F- [phone]   01/03/25  No Clx needed

## 2025-02-04 ENCOUNTER — OFFICE VISIT (OUTPATIENT)
Dept: FAMILY MEDICINE CLINIC | Facility: CLINIC | Age: 70
End: 2025-02-04
Payer: COMMERCIAL

## 2025-02-04 ENCOUNTER — LAB ENCOUNTER (OUTPATIENT)
Dept: LAB | Facility: HOSPITAL | Age: 70
End: 2025-02-04
Attending: FAMILY MEDICINE
Payer: COMMERCIAL

## 2025-02-04 VITALS
HEIGHT: 69.5 IN | WEIGHT: 315 LBS | SYSTOLIC BLOOD PRESSURE: 132 MMHG | HEART RATE: 74 BPM | RESPIRATION RATE: 16 BRPM | DIASTOLIC BLOOD PRESSURE: 80 MMHG | BODY MASS INDEX: 45.61 KG/M2 | TEMPERATURE: 98 F | OXYGEN SATURATION: 97 %

## 2025-02-04 DIAGNOSIS — M47.816 LUMBAR SPONDYLOSIS: ICD-10-CM

## 2025-02-04 DIAGNOSIS — E66.01 MORBID OBESITY WITH BMI OF 50.0-59.9, ADULT (HCC): ICD-10-CM

## 2025-02-04 DIAGNOSIS — E88.09 PSEUDOCHOLINESTERASE DEFICIENCY: ICD-10-CM

## 2025-02-04 DIAGNOSIS — G47.00 INSOMNIA, UNSPECIFIED TYPE: ICD-10-CM

## 2025-02-04 DIAGNOSIS — E78.5 DYSLIPIDEMIA: ICD-10-CM

## 2025-02-04 DIAGNOSIS — J98.4 RESTRICTIVE LUNG DISEASE: ICD-10-CM

## 2025-02-04 DIAGNOSIS — Z01.818 PREOP EXAMINATION: ICD-10-CM

## 2025-02-04 DIAGNOSIS — G89.29 CHRONIC PAIN OF BOTH SHOULDERS: ICD-10-CM

## 2025-02-04 DIAGNOSIS — Z01.812 PRE-OPERATIVE LABORATORY EXAMINATION: ICD-10-CM

## 2025-02-04 DIAGNOSIS — R73.01 ELEVATED FASTING BLOOD SUGAR: ICD-10-CM

## 2025-02-04 DIAGNOSIS — M17.0 OSTEOARTHRITIS OF BOTH KNEES, UNSPECIFIED OSTEOARTHRITIS TYPE: Primary | ICD-10-CM

## 2025-02-04 DIAGNOSIS — L40.50 PSORIATIC ARTHRITIS (HCC): ICD-10-CM

## 2025-02-04 DIAGNOSIS — M47.812 FACET ARTHROPATHY, CERVICAL: ICD-10-CM

## 2025-02-04 DIAGNOSIS — M48.02 NEUROFORAMINAL STENOSIS OF CERVICAL SPINE: ICD-10-CM

## 2025-02-04 DIAGNOSIS — M25.512 CHRONIC PAIN OF BOTH SHOULDERS: ICD-10-CM

## 2025-02-04 DIAGNOSIS — H90.5 SENSORINEURAL HEARING LOSS (SNHL) OF RIGHT EAR, UNSPECIFIED HEARING STATUS ON CONTRALATERAL SIDE: ICD-10-CM

## 2025-02-04 DIAGNOSIS — M25.50 POLYARTHRALGIA: ICD-10-CM

## 2025-02-04 DIAGNOSIS — M25.511 CHRONIC PAIN OF BOTH SHOULDERS: ICD-10-CM

## 2025-02-04 LAB
ALBUMIN SERPL-MCNC: 4.2 G/DL (ref 3.2–4.8)
ALBUMIN/GLOB SERPL: 1.6 {RATIO} (ref 1–2)
ALP LIVER SERPL-CCNC: 70 U/L
ALT SERPL-CCNC: 13 U/L
ANION GAP SERPL CALC-SCNC: 6 MMOL/L (ref 0–18)
AST SERPL-CCNC: 15 U/L (ref ?–34)
ATRIAL RATE: 55 BPM
BASOPHILS # BLD AUTO: 0.02 X10(3) UL (ref 0–0.2)
BASOPHILS NFR BLD AUTO: 0.3 %
BILIRUB SERPL-MCNC: 0.7 MG/DL (ref 0.2–1.1)
BUN BLD-MCNC: 14 MG/DL (ref 9–23)
BUN/CREAT SERPL: 17.7 (ref 10–20)
CALCIUM BLD-MCNC: 9.8 MG/DL (ref 8.7–10.4)
CHLORIDE SERPL-SCNC: 106 MMOL/L (ref 98–112)
CO2 SERPL-SCNC: 31 MMOL/L (ref 21–32)
CREAT BLD-MCNC: 0.79 MG/DL
DEPRECATED RDW RBC AUTO: 47.9 FL (ref 35.1–46.3)
EGFRCR SERPLBLD CKD-EPI 2021: 97 ML/MIN/1.73M2 (ref 60–?)
EOSINOPHIL # BLD AUTO: 0.11 X10(3) UL (ref 0–0.7)
EOSINOPHIL NFR BLD AUTO: 1.8 %
ERYTHROCYTE [DISTWIDTH] IN BLOOD BY AUTOMATED COUNT: 13.6 % (ref 11–15)
EST. AVERAGE GLUCOSE BLD GHB EST-MCNC: 103 MG/DL (ref 68–126)
FASTING STATUS PATIENT QL REPORTED: YES
GLOBULIN PLAS-MCNC: 2.6 G/DL (ref 2–3.5)
GLUCOSE BLD-MCNC: 82 MG/DL (ref 70–99)
HBA1C MFR BLD: 5.2 % (ref ?–5.7)
HCT VFR BLD AUTO: 42.9 %
HGB BLD-MCNC: 13.9 G/DL
IMM GRANULOCYTES # BLD AUTO: 0.01 X10(3) UL (ref 0–1)
IMM GRANULOCYTES NFR BLD: 0.2 %
LYMPHOCYTES # BLD AUTO: 1.74 X10(3) UL (ref 1–4)
LYMPHOCYTES NFR BLD AUTO: 28 %
MCH RBC QN AUTO: 31 PG (ref 26–34)
MCHC RBC AUTO-ENTMCNC: 32.4 G/DL (ref 31–37)
MCV RBC AUTO: 95.8 FL
MONOCYTES # BLD AUTO: 0.26 X10(3) UL (ref 0.1–1)
MONOCYTES NFR BLD AUTO: 4.2 %
NEUTROPHILS # BLD AUTO: 4.08 X10 (3) UL (ref 1.5–7.7)
NEUTROPHILS # BLD AUTO: 4.08 X10(3) UL (ref 1.5–7.7)
NEUTROPHILS NFR BLD AUTO: 65.5 %
OSMOLALITY SERPL CALC.SUM OF ELEC: 296 MOSM/KG (ref 275–295)
P AXIS: 44 DEGREES
P-R INTERVAL: 172 MS
PLATELET # BLD AUTO: 210 10(3)UL (ref 150–450)
POTASSIUM SERPL-SCNC: 4.8 MMOL/L (ref 3.5–5.1)
PROT SERPL-MCNC: 6.8 G/DL (ref 5.7–8.2)
Q-T INTERVAL: 402 MS
QRS DURATION: 90 MS
QTC CALCULATION (BEZET): 384 MS
R AXIS: -7 DEGREES
RBC # BLD AUTO: 4.48 X10(6)UL
SODIUM SERPL-SCNC: 143 MMOL/L (ref 136–145)
T AXIS: 9 DEGREES
VENTRICULAR RATE: 55 BPM
WBC # BLD AUTO: 6.2 X10(3) UL (ref 4–11)

## 2025-02-04 PROCEDURE — 80053 COMPREHEN METABOLIC PANEL: CPT

## 2025-02-04 PROCEDURE — 83036 HEMOGLOBIN GLYCOSYLATED A1C: CPT

## 2025-02-04 PROCEDURE — 93005 ELECTROCARDIOGRAM TRACING: CPT

## 2025-02-04 PROCEDURE — 93010 ELECTROCARDIOGRAM REPORT: CPT | Performed by: STUDENT IN AN ORGANIZED HEALTH CARE EDUCATION/TRAINING PROGRAM

## 2025-02-04 PROCEDURE — 85025 COMPLETE CBC W/AUTO DIFF WBC: CPT

## 2025-02-04 PROCEDURE — 36415 COLL VENOUS BLD VENIPUNCTURE: CPT

## 2025-02-04 PROCEDURE — 87081 CULTURE SCREEN ONLY: CPT

## 2025-02-04 RX ORDER — GUSELKUMAB 100 MG/ML
1 INJECTION SUBCUTANEOUS
COMMUNITY
Start: 2024-12-27

## 2025-02-04 NOTE — H&P
[facility] PRE-OP CLINIC [place]    PRE-OP NOTE    HPI:   I have been consulted by Dr. Chance to see Chris Martini 69 year old male for a preoperative evaluation and medical clearance. He has a long history of worsening severe degenerative arthritis of his knees . Patient is to have a right total knee arthroplasty  by Dr. Chance on 2/17/2025.     Pt suffers significant pain and loss of function.     He has no cardiopulmonary symptoms.      He has no history of PENNIE or DVT that he is aware of. Denies tobacco use.       Family History   Problem Relation Age of Onset    Cancer Father         leukemia    Other (Other) Son         rheumatoid arthritis      Current Outpatient Medications   Medication Sig Dispense Refill    TREMFYA 100 MG/ML Subcutaneous Solution Auto-injector 1 Dose Every 8 (eight) weeks.      Tirzepatide-Weight Management (ZEPBOUND) 5 MG/0.5ML Subcutaneous Solution Inject 5 mg into the skin once a week. 2 mL 2    ibuprofen (ADVIL) 200 MG Oral Tab Take 3 tablets (600 mg total) by mouth as needed for Pain.       Past Medical History:    Anesthesia complication    due to Pseudocholinesterase deficiency    BACK PAIN    lumbar    History of 2019 novel coronavirus disease (COVID-19)    ASYMPTOMATIC,  NO HOSPITALIZATION    Osteoarthritis    Pseudocholinesterase deficiency    HISTORY AND ALLERGIC TO ANECTINE    Psoriatic arthritis (HCC)    Visual impairment    GLASSES     Past Surgical History:   Procedure Laterality Date    Colonoscopy Right 2/21/2020    Procedure: COLONOSCOPY;  Surgeon: Pierre Peñlaoza MD;  Location: [facility] ENDOSCOPY    Excisional biopsy left  2019    gynecomastia    Other surgical history      pancreatic cyst removed at age 12, knee arthroscopy, vocal cord ploypectomis     Social History     Socioeconomic History    Marital status:      Spouse name: Not on file    Number of children: Not on file    Years of education: Not on file    Highest education level: Not on file   Occupational  History    Not on file   Tobacco Use    Smoking status: Never     Passive exposure: Never    Smokeless tobacco: Never   Vaping Use    Vaping status: Never Used   Substance and Sexual Activity    Alcohol use: Yes     Comment: 2-3 drink per week    Drug use: Never    Sexual activity: Not on file   Other Topics Concern    Not on file   Social History Narrative    Not on file     Social Drivers of Health     Food Insecurity: Not on file   Transportation Needs: Not on file   Stress: Not on file   Housing Stability: Not on file       REVIEW OF SYSTEMS:   CONSTITUTIONAL:  Denies unusual weight gain/loss, fever, chills  EENT:  Eyes:  Denies eye pain, visual loss, blurred vision, double vision. Ears, Nose, Throat:  Denies congestion, runny nose or sore throat.  INTEGUMENTARY:  Denies rashes, itching, skin lesion,   CARDIOVASCULAR:  Denies DVT. Denies chest pain, palpitations, edema, dyspnea  RESPIRATORY: Denies  PENNIE ,Denies shortness of breath, wheezing, cough  GASTROINTESTINAL:  Denies abdominal pain, nausea, vomiting, constipation, diarrhea, or blood in stool.  MUSCULOSKELETAL: severe pain to his knees as noted above  NEUROLOGICAL:  Denies headache, seizures, dizziness, syncope, paralysis, ataxia,  HEMATOLOGIC:  Denies anemia, bleeding or bruising.  LYMPHATICS:  Denies enlarged nodes   PSYCHIATRIC:  Denies depression or anxiety.  ENDOCRINOLOGIC: DM 2 NO,   ALLERGIES:  Denies allergic response, history of asthma, hives,     EXAM:   /80 (BP Location: Left arm)   Pulse 74   Temp 97.6 °F (36.4 °C) (Temporal)   Resp 16   Ht 5' 9.5\" (1.765 m)   Wt (!) 340 lb (154.2 kg)   SpO2 97%   BMI 49.49 kg/m²  Estimated body mass index is 49.49 kg/m² as calculated from the following:    Height as of this encounter: 5' 9.5\" (1.765 m).    Weight as of this encounter: 340 lb (154.2 kg).   Vital signs reviewed.Appears stated age, well groomed.  Physical Exam:  GEN:  Patient is alert, awake and oriented, well developed, well  nourished, no apparent distress.  HEENT:  Head:  Normocephalic, atraumatic Nose: patent, no nasal discharge  NECK: Supple, no CLAD, no carotid bruit, no thyromegaly.  SKIN: No rashes, no skin lesion, no bruising, good turgor.  HEART:  Regular rate and rhythm, no murmurs, rubs or gallops.  LUNGS: Clear to auscultation bilterally, no rales/rhonchi/wheezing.  CHEST: No tenderness.  ABDOMEN:  Obese but soft, nondistended, nontender,  no masses, no hepatosplenomegaly.  BACK: No tenderness, no spasm,   EXTREMITIES:  bilateral knee hypertrophic changes ,   NEURO:  No focal deficit, speech fluent, he has a very antalgic  gait, strength and tone, sensory intact      Lab Results   Component Value Date    WBC 6.1 07/10/2024    HGB 14.1 07/10/2024    HCT 42.6 07/10/2024    .0 07/10/2024    CREATSERUM 0.82 07/10/2024    BUN 14 07/10/2024     07/10/2024    K 4.6 07/10/2024     07/10/2024    CO2 29.0 07/10/2024    GLU 83 07/10/2024    CA 9.5 07/10/2024    ALB 4.2 07/10/2024    ALKPHO 74 07/10/2024    BILT 0.8 07/10/2024    TP 6.8 07/10/2024    AST 17 07/10/2024    ALT 20 07/10/2024    TSH 1.598 07/10/2024    PSA 0.464 11/17/2020    ESRML 30 (H) 04/07/2022    CRP 1.23 (H) 04/07/2022    B12 544 02/11/2020       No results found.          ASSESSMENT AND PLAN:   Chris Martini is a 69 year old male, with a hx of severe degenerative arthritis of his knees  who presents for a pre-operative physical exam. Patient is to have a right total knee arthroplasty  by Dr. Chance on 2/17/2025.      ICD-10-CM    1. Osteoarthritis of both knees, unspecified osteoarthritis type  M17.0       2. Morbid obesity with BMI of 50.0-59.9, adult (HCC)  E66.01     Z68.43       3. Dyslipidemia  E78.5       4. Neuroforaminal stenosis of cervical spine  M48.02       5. Facet arthropathy, cervical  M47.812       6. Polyarthralgia  M25.50       7. Chronic pain of both shoulders  M25.511     G89.29     M25.512       8. Insomnia, unspecified type   G47.00       9. Psoriatic arthritis (HCC)  L40.50       10. Restrictive lung disease  J98.4       11. Sensorineural hearing loss (SNHL) of right ear, unspecified hearing status on contralateral side  H90.5       12. Pseudocholinesterase deficiency  E88.09       13. Lumbar spondylosis  M47.816       14. Elevated fasting blood sugar  R73.01 CBC With Differential With Platelet     Comp Metabolic Panel (14)     EKG 12 Lead     MSSA and MRSA Culture Screen     Hemoglobin A1C      15. Pre-operative laboratory examination  Z01.812 CBC With Differential With Platelet     Comp Metabolic Panel (14)     EKG 12 Lead     MSSA and MRSA Culture Screen     Hemoglobin A1C      16. Preop examination  Z01.818 CBC With Differential With Platelet     Comp Metabolic Panel (14)     EKG 12 Lead     MSSA and MRSA Culture Screen     Hemoglobin A1C        Patient Active Problem List   Diagnosis    Atypical nevus    Neuroforaminal stenosis of cervical spine    Facet arthropathy, cervical    Morbid obesity with BMI of 50.0-59.9, adult (HCC)    Polyarthralgia    Insomnia    Chronic pain of both shoulders    Sensorineural hearing loss (SNHL) of right ear    Tinnitus of right ear    Dyslipidemia    Encounter for therapeutic drug monitoring        ECG and labs are pending review     Preoperative Risk Stratification: There are no decompensated medical conditions. ASA classification 2    Medical history:  High risk surgery (vascular, thoracic, intra-peritoneal): No  CAD: No  CHF: No  Stroke: No  DM on insulin: No  Serum Creatinine >2 mg/dl: No  Patient has an RCRI score that is intermediate risk and is at intermediate risk for major cardiac event in the perioperative period.     Patient is medically optimized and has an acceptable risk of surgery and may proceed with surgery as planned.     PLAN:    Alert anesthesia to his anesthesia experience with his Pseudocholinesterase deficiency     Patient to discontinue medications and supplements with  anticoagulation properties as per instruction.        Postoperative Recommendations:    Anticoagulation / DVT prophylaxis: SCDs, early ambulation. ASA 81 mg twice daily with food for four weeks.    GI protection: Protonix  Incentive Spirometry   Telemetry as needed  CPAP/O2 as needed   DM: QID glucoscans and sliding scale insulin as needed   Renal protection: (hydration / NSAID and ACE/ARB avoidance)   Cognitive protection: (minimize narcotics, benzodiazepines, scopolamine)     Pain management and Physical therapy as per Orthopedic service.   Home Health as needed    Thank you for the opportunity to care for your patient and to assist in managing the postoperative course.        Chavez Wen DO  2/4/2025  8:39 AM

## 2025-02-04 NOTE — TELEPHONE ENCOUNTER
Dr. Wen, please review:    Labs- RDW-SD (47.9), calc osmo (296), MRSA neg, all other labs WNL    EKG- sinus bradycardia, nonspecific ST abnormality. When compared with EKG on 06/09/24 no significant change was found.    X-ray- done 02/26/24 WNL    OK for surgery?

## 2025-03-03 RX ORDER — TIRZEPATIDE 5 MG/.5ML
5 INJECTION, SOLUTION SUBCUTANEOUS WEEKLY
Qty: 2 ML | Refills: 1 | Status: SHIPPED | OUTPATIENT
Start: 2025-03-03

## 2025-04-01 ENCOUNTER — HOSPITAL ENCOUNTER (OUTPATIENT)
Age: 70
Discharge: HOME | End: 2025-04-01
Payer: COMMERCIAL

## 2025-04-01 VITALS
OXYGEN SATURATION: 97 % | RESPIRATION RATE: 16 BRPM | TEMPERATURE: 99 F | SYSTOLIC BLOOD PRESSURE: 149 MMHG | HEART RATE: 83 BPM | DIASTOLIC BLOOD PRESSURE: 80 MMHG

## 2025-04-01 DIAGNOSIS — S46.811A TRAPEZIUS MUSCLE STRAIN, RIGHT, INITIAL ENCOUNTER: Primary | ICD-10-CM

## 2025-04-01 PROCEDURE — 96372 THER/PROPH/DIAG INJ SC/IM: CPT

## 2025-04-01 PROCEDURE — 99214 OFFICE O/P EST MOD 30 MIN: CPT

## 2025-04-01 RX ORDER — METHOCARBAMOL 500 MG/1
500 TABLET, FILM COATED ORAL 3 TIMES DAILY
Qty: 15 TABLET | Refills: 0 | Status: SHIPPED | OUTPATIENT
Start: 2025-04-01 | End: 2025-04-06

## 2025-04-01 RX ORDER — KETOROLAC TROMETHAMINE 30 MG/ML
30 INJECTION, SOLUTION INTRAMUSCULAR; INTRAVENOUS ONCE
Status: COMPLETED | OUTPATIENT
Start: 2025-04-01 | End: 2025-04-01

## 2025-04-01 RX ORDER — LIDOCAINE 50 MG/G
1 PATCH TOPICAL EVERY 24 HOURS
Qty: 5 PATCH | Refills: 0 | Status: SHIPPED | OUTPATIENT
Start: 2025-04-01 | End: 2025-04-06

## 2025-04-01 RX ORDER — METHYLPREDNISOLONE 4 MG/1
TABLET ORAL
Qty: 1 EACH | Refills: 0 | Status: SHIPPED | OUTPATIENT
Start: 2025-04-01

## 2025-04-01 NOTE — ED INITIAL ASSESSMENT (HPI)
Patient arrives ambulatory with c/o neck pain x 3 days along with spasms and shooting pain. Reports taking tylenol and advil without relief. Reports similar episode several years ago that he took steroids, muscle relaxers, and another medication for that helped.

## 2025-04-01 NOTE — ED PROVIDER NOTES
Patient Seen in: [facility] [place]      History     Chief Complaint   Patient presents with    Neck Pain     Stated Complaint: Right side neck pain    Subjective:   HPI    69-year-old male here for evaluation of right sided neck and posterior shoulder pain.  Patient had a knee replacement 4 weeks ago and has been sleeping in a recliner due to that.  He states for 3 days he is noted spasms and pain on the right side of his neck and shoulder.  He has been taking Tylenol and ibuprofen with no relief.  Patient notes he has had something like this previously and prednisone, muscle relaxers have helped.    Objective:     Past Medical History:    Anesthesia complication    due to Pseudocholinesterase deficiency    BACK PAIN    lumbar    History of 2019 novel coronavirus disease (COVID-19)    ASYMPTOMATIC,  NO HOSPITALIZATION    Osteoarthritis    Pseudocholinesterase deficiency    HISTORY AND ALLERGIC TO ANECTINE    Psoriatic arthritis (HCC)    Visual impairment    GLASSES              Past Surgical History:   Procedure Laterality Date    Colonoscopy Right 2/21/2020    Procedure: COLONOSCOPY;  Surgeon: Pierre Peñaloza MD;  Location: [facility] ENDOSCOPY    Excisional biopsy left  2019    gynecomastia    Other surgical history      pancreatic cyst removed at age 12, knee arthroscopy, vocal cord ploypectomis                Social History     Socioeconomic History    Marital status:    Tobacco Use    Smoking status: Never     Passive exposure: Never    Smokeless tobacco: Never   Vaping Use    Vaping status: Never Used   Substance and Sexual Activity    Alcohol use: Yes     Comment: 2-3 drink per week    Drug use: Never              Review of Systems    Positive for stated complaint: Right side neck pain  Other systems are as noted in HPI.  Constitutional and vital signs reviewed.      All other systems reviewed and negative except as noted above.    Physical Exam     ED Triage Vitals [04/01/25 1732]   /80   Pulse 83    Resp 16   Temp 99.4 °F (37.4 °C)   Temp src Oral   SpO2 97 %   O2 Device None (Room air)       Current Vitals:   Vital Signs  BP: 149/80  Pulse: 83  Resp: 16  Temp: 99.4 °F (37.4 °C)  Temp src: Oral    Oxygen Therapy  SpO2: 97 %  O2 Device: None (Room air)        Physical Exam  Vitals and nursing note reviewed.   Constitutional:       General: He is not in acute distress.  HENT:      Head: Normocephalic and atraumatic.      Right Ear: External ear normal.      Left Ear: External ear normal.      Nose: Nose normal.      Mouth/Throat:      Mouth: Mucous membranes are moist.   Eyes:      Extraocular Movements: Extraocular movements intact.      Pupils: Pupils are equal, round, and reactive to light.   Neck:        Comments: There is ttp over the trapezius muscle. No midline cervical tenderness  Cardiovascular:      Rate and Rhythm: Normal rate.   Pulmonary:      Effort: Pulmonary effort is normal.   Abdominal:      General: Abdomen is flat.   Musculoskeletal:         General: Normal range of motion.      Cervical back: Normal range of motion.   Skin:     General: Skin is warm.   Neurological:      General: No focal deficit present.      Mental Status: He is alert and oriented to person, place, and time.   Psychiatric:         Mood and Affect: Mood normal.         Behavior: Behavior normal.             ED Course   Labs Reviewed - No data to display     69-year-old male presenting for evaluation of right-sided neck pain, shoulder pain.  Feels like spasm.  He is neurovascularly intact with normal strength and sensation.  No midline cervical pain  Ddx-cervical strain, spasm, torticollis    Patient agreeable to Toradol in the immediate care.  I will discharge him with Medrol, ibuprofen and Lidoderm patch.  Robaxin Rx'd as well.    PCP follow-up, return precautions reviewed           [facility]              Medical Decision Making      Disposition and Plan     Clinical Impression:  1. Trapezius muscle strain, right, initial  encounter         Disposition:  Discharge  4/1/2025  6:01 pm    Follow-up:  Mani Teran MD  [address] [place] [address]  [phone]                Medications Prescribed:  Discharge Medication List as of 4/1/2025  6:02 PM        START taking these medications    Details   methylPREDNISolone (MEDROL) 4 MG Oral Tablet Therapy Pack Dosepack: take as directed, Normal, Disp-1 each, R-0      lidocaine 5 % External Patch Place 1 patch onto the skin daily for 5 days., Normal, Disp-5 patch, R-0      methocarbamol 500 MG Oral Tab Take 1 tablet (500 mg total) by mouth 3 (three) times daily for 5 days., Normal, Disp-15 tablet, R-0                 Supplementary Documentation:

## 2025-05-08 RX ORDER — TIRZEPATIDE 5 MG/.5ML
5 INJECTION, SOLUTION SUBCUTANEOUS WEEKLY
Qty: 2 ML | Refills: 0 | Status: SHIPPED | OUTPATIENT
Start: 2025-05-08

## 2025-06-09 RX ORDER — TIRZEPATIDE 5 MG/.5ML
INJECTION, SOLUTION SUBCUTANEOUS
Qty: 2 ML | Refills: 0 | Status: SHIPPED | OUTPATIENT
Start: 2025-06-09

## 2025-07-17 RX ORDER — TIRZEPATIDE 5 MG/.5ML
5 INJECTION, SOLUTION SUBCUTANEOUS WEEKLY
Qty: 2 ML | Refills: 0 | Status: SHIPPED | OUTPATIENT
Start: 2025-07-17

## (undated) DEVICE — ADHESIVE MASTISOL 2/3ML

## (undated) DEVICE — SLEEVE KENDALL SCD EXPRESS MED

## (undated) DEVICE — ABDOMINAL PAD: Brand: DERMACEA

## (undated) DEVICE — FORCEP RADIAL JAW 4

## (undated) DEVICE — MEDI-VAC NON-CONDUCTIVE SUCTION TUBING 6MM X 1.8M (6FT.) L: Brand: CARDINAL HEALTH

## (undated) DEVICE — STERILE POLYISOPRENE POWDER-FREE SURGICAL GLOVES: Brand: PROTEXIS

## (undated) DEVICE — SUPER SPONGES,MEDIUM: Brand: KERLIX

## (undated) DEVICE — SUTURE VICRYL 2-0

## (undated) DEVICE — DRAPE PACK CHEST & U BAR

## (undated) DEVICE — GOWN,SIRUS,FABRIC-REINFORCED,LARGE: Brand: MEDLINE

## (undated) DEVICE — HEMOCLIP HORIZON MED 002200

## (undated) DEVICE — CG INFILTRATION TUBING: Brand: CG INFILTRATION TUBING

## (undated) DEVICE — 35 ML SYRINGE REGULAR TIP: Brand: MONOJECT

## (undated) DEVICE — #15 STERILE STAINLESS BLADE: Brand: STERILE STAINLESS BLADES

## (undated) DEVICE — TOWEL SURG OR 17X30IN BLUE

## (undated) DEVICE — LINE MNTR ADLT SET O2 INTMD

## (undated) DEVICE — LIGHT HANDLE

## (undated) DEVICE — SOLUTION  .9 1000ML BTL

## (undated) DEVICE — Device: Brand: CUSTOM PROCEDURE KIT

## (undated) DEVICE — MEGADYNE E-Z CLEAN BLADE 2.75"

## (undated) DEVICE — SUTURE VICRYL 3-0 SH

## (undated) DEVICE — 3M™ STERI-STRIP™ REINFORCED ADHESIVE SKIN CLOSURES, R1547, 1/2 IN X 4 IN (12 MM X 100 MM), 6 STRIPS/ENVELOPE: Brand: 3M™ STERI-STRIP™

## (undated) DEVICE — HEMOCLIP HORIZON SM 001200

## (undated) DEVICE — UNDYED BRAIDED (POLYGLACTIN 910), SYNTHETIC ABSORBABLE SUTURE: Brand: COATED VICRYL

## (undated) DEVICE — Device: Brand: DEFENDO AIR/WATER/SUCTION AND BIOPSY VALVE

## (undated) DEVICE — 3 ML SYRINGE LUER-LOCK TIP: Brand: MONOJECT

## (undated) DEVICE — DRAPE,TAPE STRIPS,STERILE: Brand: MEDLINE

## (undated) DEVICE — #10 STERILE BLADE: Brand: POLYMER COATED BLADES

## (undated) DEVICE — MEGADYNE E-Z CLEAN BLADE 4IN

## (undated) DEVICE — 6 ML SYRINGE LUER-LOCK TIP: Brand: MONOJECT

## (undated) DEVICE — BREAST-HERNIA-PORT CDS-LF: Brand: MEDLINE INDUSTRIES, INC.

## (undated) DEVICE — CLOSURE EXOFIN 1.0ML

## (undated) NOTE — LETTER
Yess[address]   Date:   10/16/2020     Name:   Manas Baker    YOB: 1955   MRN:   [id number]       WHERE IS YOUR PAIN NOW? Tyson the areas on your body where you feel the described sensations.   Use the appropriate

## (undated) NOTE — LETTER
[address] [address]   Date:   7/27/2020     Name:   Manas Baker    YOB: 1955   MRN:   [id number]       [facility]? Tyson the areas on your body where you feel the described sensations.

## (undated) NOTE — LETTER
[address]   Date:   6/2/2021     Name:   Hernando Griffiths    YOB: 1955   MRN:   [id number]       WHERE IS YOUR PAIN NOW? Tyson the areas on your body where you feel the described sensations.   Use the appropriate sy

## (undated) NOTE — Clinical Note
Annika, I met with Chris in clinic today for weight loss/management. I have recommended intensive lifestyle/behavioral modifications for weight loss. In addition, I have recommended dietician visits and medication to help support his efforts. He has some interest in bariatric surgery. He will follow up routinely for ongoing support.   Please let me know if you have any questions or concerns. Thank you very much for referring your patient to our clinic.   Take good care, HERBERT Groves

## (undated) NOTE — LETTER
[address] [address]   Date:   8/16/2021     Name:   Sam Penn    YOB: 1955   MRN:   [id number]       [facility]? Tyson the areas on your body where you feel the described sensations.

## (undated) NOTE — ED AVS SNAPSHOT
Delicia Mejia   MRN: [id number]    Department:  [facility] Emergency Department   Date of Visit:  1/30/2020           Disclosure     Insurance plans vary and the physician(s) referred by the ER may not be covered by your plan.  Please contact CARE PHYSICIAN AT ONCE OR RETURN IMMEDIATELY TO THE EMERGENCY DEPARTMENT. If you have been prescribed any medication(s), please fill your prescription right away and begin taking the medication(s) as directed.   If you believe that any of the medications

## (undated) NOTE — ED AVS SNAPSHOT
Rosario Pereira   MRN: [id number]    Department:  [facility] Emergency Department   Date of Visit:  8/29/2019           Disclosure     Insurance plans vary and the physician(s) referred by the ER may not be covered by your plan.  Please contact CARE PHYSICIAN AT ONCE OR RETURN IMMEDIATELY TO THE EMERGENCY DEPARTMENT. If you have been prescribed any medication(s), please fill your prescription right away and begin taking the medication(s) as directed.   If you believe that any of the medications

## (undated) NOTE — LETTER
MEDICATION CONTRAINDICATION     Patient Name: Roldan Barboza CSN: [id number]  -Age / Sex: 10/23/1955-A: 61 y  male Medical Records: [id number]    Surgeon(s):  Jed Andrade MD  Procedure: RIGHT BREAST SIMPLE MASTECTOMY, Right    Procedure Comments: RIGHT BREAST SIMPLE MASTECTOMY  Anesthesia Type: MAC    PATIENT WITH ALLERGY TO ANECTINE, (HAS PSEUDOCHOLINESTERASE DEFICIENCY). HEPARIN subcutaneous WAS INDICATED IN STANDING ORDERS FOR SURGICAL PROCEDURE DUE TO BMI >40. (HEPARIN WAS HIGHLIGHTED WITH CONTRAINDICATIONS)      PLEASE ADVISE FOR THE USE OF HEPARIN subcutaneous BEFORE PROCEDURE. THANK YOU    The above patient has a contraindication to the medication ordered from your standing orders/[facility] Pre-Op STanding orders listed below. If you would like the medication given, please fax this form back indicating the override reason with physician signature/date/time.               ___  Benefit outweighs risk   ___  Insignificant               ___ Low risk                 ___ Not a true allergy              ___ Dose appropriate                ___  Tolerated regimen in the past     Physician Signature: ________________________ Date/Time: ______________  Smiley Kussmaul FORM BACK TO:  [phone]  Rev 14

## (undated) NOTE — LETTER
2/24/2020          [address] [place] [id number]    Dear Narciso Roblero,       Here are the  biopsy/pathology findings from your recent Colonoscopy: Polyps removed were Not pre-cancerous type; Recommend repeat colonoscopy in 10 years.